# Patient Record
Sex: FEMALE | Race: WHITE | NOT HISPANIC OR LATINO | URBAN - METROPOLITAN AREA
[De-identification: names, ages, dates, MRNs, and addresses within clinical notes are randomized per-mention and may not be internally consistent; named-entity substitution may affect disease eponyms.]

---

## 2023-10-17 DIAGNOSIS — Z76.0 MEDICATION REFILL: ICD-10-CM

## 2023-10-17 NOTE — TELEPHONE ENCOUNTER
PT came in requesting refills on RX omeprazole 40 MG and amlodipine/benazepril caps 5/10 MG -[facility] to [facility].

## 2023-10-18 RX ORDER — AMLODIPINE AND BENAZEPRIL HYDROCHLORIDE 5; 10 MG/1; MG/1
1 CAPSULE ORAL EVERY 24 HOURS
Qty: 90 CAPSULE | Refills: 1 | Status: SHIPPED | OUTPATIENT
Start: 2023-10-18 | End: 2024-01-22 | Stop reason: SDUPTHER

## 2023-10-18 RX ORDER — OMEPRAZOLE 40 MG/1
40 CAPSULE, DELAYED RELEASE ORAL EVERY 24 HOURS
Qty: 90 CAPSULE | Refills: 1 | Status: SHIPPED | OUTPATIENT
Start: 2023-10-18 | End: 2024-01-22 | Stop reason: SDUPTHER

## 2023-10-18 RX ORDER — AMLODIPINE AND BENAZEPRIL HYDROCHLORIDE 5; 10 MG/1; MG/1
1 CAPSULE ORAL EVERY 24 HOURS
COMMUNITY
Start: 2021-12-06 | End: 2023-10-18 | Stop reason: SDUPTHER

## 2023-10-18 RX ORDER — OMEPRAZOLE 40 MG/1
40 CAPSULE, DELAYED RELEASE ORAL EVERY 24 HOURS
COMMUNITY
Start: 2021-11-03 | End: 2023-10-18 | Stop reason: SDUPTHER

## 2024-01-22 DIAGNOSIS — Z76.0 MEDICATION REFILL: ICD-10-CM

## 2024-01-22 RX ORDER — AMLODIPINE AND BENAZEPRIL HYDROCHLORIDE 5; 10 MG/1; MG/1
1 CAPSULE ORAL EVERY 24 HOURS
Qty: 90 CAPSULE | Refills: 1 | Status: SHIPPED | OUTPATIENT
Start: 2024-01-22 | End: 2024-01-30 | Stop reason: SDUPTHER

## 2024-01-22 RX ORDER — OMEPRAZOLE 40 MG/1
40 CAPSULE, DELAYED RELEASE ORAL EVERY 24 HOURS
Qty: 90 CAPSULE | Refills: 1 | Status: SHIPPED | OUTPATIENT
Start: 2024-01-22 | End: 2024-01-30 | Stop reason: SDUPTHER

## 2024-01-22 NOTE — TELEPHONE ENCOUNTER
PT requesting refills on RX omeprazole 40 MG and amlodipine. [facility]   good balance

## 2024-01-30 DIAGNOSIS — Z76.0 MEDICATION REFILL: ICD-10-CM

## 2024-01-30 RX ORDER — AMLODIPINE AND BENAZEPRIL HYDROCHLORIDE 5; 10 MG/1; MG/1
1 CAPSULE ORAL EVERY 24 HOURS
Qty: 90 CAPSULE | Refills: 1 | Status: SHIPPED | OUTPATIENT
Start: 2024-01-30 | End: 2024-01-31 | Stop reason: SDUPTHER

## 2024-01-30 RX ORDER — OMEPRAZOLE 40 MG/1
40 CAPSULE, DELAYED RELEASE ORAL EVERY 24 HOURS
Qty: 90 CAPSULE | Refills: 1 | Status: SHIPPED | OUTPATIENT
Start: 2024-01-30 | End: 2024-01-31 | Stop reason: SDUPTHER

## 2024-01-31 DIAGNOSIS — Z76.0 MEDICATION REFILL: ICD-10-CM

## 2024-01-31 RX ORDER — AMLODIPINE AND BENAZEPRIL HYDROCHLORIDE 5; 10 MG/1; MG/1
1 CAPSULE ORAL EVERY 24 HOURS
Qty: 90 CAPSULE | Refills: 1 | Status: SHIPPED | OUTPATIENT
Start: 2024-01-31 | End: 2024-04-10 | Stop reason: SDUPTHER

## 2024-01-31 RX ORDER — OMEPRAZOLE 40 MG/1
40 CAPSULE, DELAYED RELEASE ORAL EVERY 24 HOURS
Qty: 90 CAPSULE | Refills: 1 | Status: SHIPPED | OUTPATIENT
Start: 2024-01-31 | End: 2024-04-10 | Stop reason: SDUPTHER

## 2024-01-31 NOTE — TELEPHONE ENCOUNTER
PT requesting Refill on RX omeprizole and amlodipine.  RX sent to mail order pharmacy 1/30/2024-requesting supply be sent to [facility] in [place] until mail order pharmacy cane be delivered.

## 2024-04-10 DIAGNOSIS — Z76.0 MEDICATION REFILL: ICD-10-CM

## 2024-04-10 RX ORDER — AMLODIPINE AND BENAZEPRIL HYDROCHLORIDE 5; 10 MG/1; MG/1
1 CAPSULE ORAL EVERY 24 HOURS
Qty: 90 CAPSULE | Refills: 1 | Status: SHIPPED | OUTPATIENT
Start: 2024-04-10

## 2024-04-10 RX ORDER — OMEPRAZOLE 40 MG/1
40 CAPSULE, DELAYED RELEASE ORAL EVERY 24 HOURS
Qty: 90 CAPSULE | Refills: 1 | Status: SHIPPED | OUTPATIENT
Start: 2024-04-10

## 2024-04-10 NOTE — TELEPHONE ENCOUNTER
PT requesting refills on RX omeprazole, and amlodipine. Please send to [facility] mail order pharmacy

## 2024-07-18 DIAGNOSIS — Z76.0 MEDICATION REFILL: ICD-10-CM

## 2024-07-18 NOTE — TELEPHONE ENCOUNTER
Pt is requesting a refill on RX'S Omeprazole and Amlodipine, please send to [facility]

## 2024-07-25 DIAGNOSIS — Z76.0 MEDICATION REFILL: ICD-10-CM

## 2024-07-25 RX ORDER — AMLODIPINE AND BENAZEPRIL HYDROCHLORIDE 5; 10 MG/1; MG/1
1 CAPSULE ORAL EVERY 24 HOURS
Qty: 90 CAPSULE | Refills: 1 | Status: SHIPPED | OUTPATIENT
Start: 2024-07-25 | End: 2024-07-25 | Stop reason: SDUPTHER

## 2024-07-25 RX ORDER — AMLODIPINE AND BENAZEPRIL HYDROCHLORIDE 5; 10 MG/1; MG/1
1 CAPSULE ORAL EVERY 24 HOURS
Qty: 90 CAPSULE | Refills: 1 | Status: SHIPPED | OUTPATIENT
Start: 2024-07-25 | End: 2024-07-26 | Stop reason: SDUPTHER

## 2024-07-25 RX ORDER — OMEPRAZOLE 40 MG/1
40 CAPSULE, DELAYED RELEASE ORAL EVERY 24 HOURS
Qty: 90 CAPSULE | Refills: 1 | Status: SHIPPED | OUTPATIENT
Start: 2024-07-25 | End: 2024-07-26 | Stop reason: SDUPTHER

## 2024-07-25 RX ORDER — OMEPRAZOLE 40 MG/1
40 CAPSULE, DELAYED RELEASE ORAL EVERY 24 HOURS
Qty: 90 CAPSULE | Refills: 1 | Status: SHIPPED | OUTPATIENT
Start: 2024-07-25 | End: 2024-07-25 | Stop reason: SDUPTHER

## 2024-07-25 NOTE — TELEPHONE ENCOUNTER
Pt came in today requesting refills Omeprazole and Amlodipine, please send to [facility], pt has an appointment for 07/26/24

## 2024-07-25 NOTE — PROGRESS NOTES
This is a 89 year old female for FU visit and MED REVIEW and she broke her hearing aids and is going to see AUDIOLOGY for new hearing aids or repair of old ones.      ROS is NEG for HEADACHE, NAUSEA, VOMITING, DIARRHEA, CHEST PAIN, SOB, and BLEEDING and as further REVIEWED BELOW.    Subjective   Nirmala Patrick is a 89 y.o. female who presents for No chief complaint on file..    HPI:    Per nursing intake, pt here for No chief complaint on file.       Review of systems is essentially negative for all systems except for any identified issues in HPI above.    Objective     /76   Pulse 84   Temp 36.1 °C (96.9 °F)   Wt 71.7 kg (158 lb)   SpO2 98%   BMI 27.55 kg/m²      Physical Examination:       GENERAL           General Appearance: well-appearing, well-developed, well-hydrated, well-nourished, no acute distress.        HEENT           NECK supple, no masses or thyromegaly, no carotid bruit.        EYES           Extraocular Movements: normal, bilateral eyes ANNETTE, no conjunctival injection.        HEART           Rate and Rhythm regular rate and rhythm. Heart sounds: normal S1S2, no S3 or S4. Murmurs: none.        CHEST           Breath sounds: Clear to IPPA, RR<16 no use of accessory muscles.        ABDOMEN           General: Neg for LKKS or masses, no scleral icterus or jaundice.        MUSCULOSKELETAL           Joints Demonstration: Neg for erythema, swelling or joint deformities. gross abnormalities no gross abnormalities.        EXTREMITIES           Lower Extremities: Neg for cyanosis, clubbing or edema.       Assessment/Plan   Problem List Items Addressed This Visit    None  Visit Diagnoses       Encounter for medication review    -  Primary    Hypertension, unspecified type        Gastroesophageal reflux disease without esophagitis                FOLLOW UP:  PRN and as specified above         Lisa Li M.D.

## 2024-07-26 ENCOUNTER — OFFICE VISIT (OUTPATIENT)
Dept: PRIMARY CARE | Facility: CLINIC | Age: 89
End: 2024-07-26
Payer: MEDICARE

## 2024-07-26 VITALS
BODY MASS INDEX: 27.55 KG/M2 | WEIGHT: 158 LBS | DIASTOLIC BLOOD PRESSURE: 76 MMHG | TEMPERATURE: 96.9 F | SYSTOLIC BLOOD PRESSURE: 126 MMHG | OXYGEN SATURATION: 98 % | HEART RATE: 84 BPM

## 2024-07-26 DIAGNOSIS — Z79.899 ENCOUNTER FOR MEDICATION REVIEW: Primary | ICD-10-CM

## 2024-07-26 DIAGNOSIS — I10 HYPERTENSION, UNSPECIFIED TYPE: ICD-10-CM

## 2024-07-26 DIAGNOSIS — K21.9 GASTROESOPHAGEAL REFLUX DISEASE WITHOUT ESOPHAGITIS: ICD-10-CM

## 2024-07-26 DIAGNOSIS — Z76.0 MEDICATION REFILL: ICD-10-CM

## 2024-07-26 PROCEDURE — 3078F DIAST BP <80 MM HG: CPT | Performed by: FAMILY MEDICINE

## 2024-07-26 PROCEDURE — 1036F TOBACCO NON-USER: CPT | Performed by: FAMILY MEDICINE

## 2024-07-26 PROCEDURE — 99213 OFFICE O/P EST LOW 20 MIN: CPT | Performed by: FAMILY MEDICINE

## 2024-07-26 PROCEDURE — 1159F MED LIST DOCD IN RCRD: CPT | Performed by: FAMILY MEDICINE

## 2024-07-26 PROCEDURE — 3074F SYST BP LT 130 MM HG: CPT | Performed by: FAMILY MEDICINE

## 2024-07-26 RX ORDER — OMEPRAZOLE 40 MG/1
40 CAPSULE, DELAYED RELEASE ORAL EVERY 24 HOURS
Qty: 90 CAPSULE | Refills: 1 | Status: SHIPPED | OUTPATIENT
Start: 2024-07-26

## 2024-07-26 RX ORDER — AMLODIPINE AND BENAZEPRIL HYDROCHLORIDE 5; 10 MG/1; MG/1
1 CAPSULE ORAL EVERY 24 HOURS
Qty: 90 CAPSULE | Refills: 1 | Status: SHIPPED | OUTPATIENT
Start: 2024-07-26

## 2024-07-26 ASSESSMENT — PATIENT HEALTH QUESTIONNAIRE - PHQ9
SUM OF ALL RESPONSES TO PHQ9 QUESTIONS 1 AND 2: 0
1. LITTLE INTEREST OR PLEASURE IN DOING THINGS: NOT AT ALL
2. FEELING DOWN, DEPRESSED OR HOPELESS: NOT AT ALL

## 2024-08-14 NOTE — PROGRESS NOTES
Subjective   Nirmala Patrick is a 89 y.o. female who presents with her boyfriend Darron Phillip for TRANSFER OF PCP CARE FROM DR. SAHU FOR MEDICATION REVIEW.    HPI:      89 y.o. female who presents with her boyfriend Darron Phillip for TRANSFER OF PCP CARE FROM DR. SAHU FOR MEDICATION REVIEW.     EMR/EPIC records reviewed.    Last saw Dr. Sahu 7/26/24 for medication review.    PMHx:  HTN  GERD    Healthcare Providers:  Prior PCP: Dr. Sahu    Preventive Health Services:  -Last physical:/Medicare Wellness Visit: ?  -pap smears: no longer indicated due to advanced age  -last mammogram: ? ; no longer indicated due to advanced age  -last colonoscopy: ?; no longer indicated due to advanced age  -last STI screening: ?  -Hep C screening: ?  -DEXA: ?; NOW DUE    Immunizations:   -Childhood vaccines: completed per patient    -updated COVID spike vaccine: NOW DUE  -Prevnar 20:   NOW DUE    Immunization History   Administered Date(s) Administered    Flu vaccine, quadrivalent, high-dose, preservative free, age 65y+ (FLUZONE) 10/08/2021, 10/12/2022, 09/14/2023    Influenza, seasonal, injectable 11/04/2011    Pfizer Gray Cap SARS-CoV-2 02/22/2022, 07/21/2022    Pfizer Purple Cap SARS-CoV-2 12/31/2021    Pneumococcal conjugate vaccine, 13-valent (PREVNAR 13) 05/31/2018, 05/31/2018    Pneumococcal polysaccharide vaccine, 23-valent, age 2 years and older (PNEUMOVAX 23) 09/04/2019    Tdap vaccine, age 7 year and older (BOOSTRIX, ADACEL) 09/04/2019    Zoster, live 09/04/2013         Today Nirmala  reports:    - doing and feeling well.     -throughout visit today, patient repeatedly asked the same questions and would forget things immediately after they were discussed.     -Her boyfriend states that she frequently forgets things and he is concerned that she has dementia. She expressed interest in referral to neurology for evaluation.    -taking all medications as prescribed with no reported adverse medication side effects      Today she  has no other reported complaints, issues, or problems.  ROS is NEG for HEADACHE, NAUSEA, VOMITING, DIARRHEA, CHEST PAIN, SOB, and BLEEDING.  Review of systems (12) is  negative for all systems except for any identified issues in HPI above.    Objective     /76   Pulse 100   Temp 36.6 °C (97.8 °F)   Wt 70.3 kg (155 lb)   SpO2 100%   BMI 27.03 kg/m²      Physical Examination:       GENERAL           General Appearance: well-appearing, well-developed, well-hydrated, well-nourished, no acute distress. Would repeatedly ask questions multiple times  that had just been answered.        HEENT           NECK supple, no masses or thyromegaly, no carotid bruit.        EYES           Extraocular Movements: normal, bilateral eyes ANNETTE, no conjunctival injection.        HEART           Rate and Rhythm regular rate and rhythm. Heart sounds: normal S1S2, no S3 or S4. Murmurs: none.        CHEST           Breath sounds: Clear to IPPA, RR<16 no use of accessory muscles.        ABDOMEN           General: Neg for LKKS or masses, no scleral icterus or jaundice.        MUSCULOSKELETAL           Joints Demonstration: Neg for erythema, swelling or joint deformities. gross abnormalities no gross abnormalities.        EXTREMITIES           Lower Extremities: Neg for cyanosis, clubbing or edema.       SKIN: multiple atypical nevi on face, arms, and legs      Assessment/Plan   Problem List Items Addressed This Visit    None  Visit Diagnoses       Primary hypertension    -  Primary    Relevant Orders    Comprehensive metabolic panel    Urinalysis with Reflex Microscopic    Encounter for medication review        Gastroesophageal reflux disease without esophagitis        Lipid screening        Relevant Orders    Lipid panel    Vitamin D deficiency        Relevant Orders    Vitamin D 25-Hydroxy,Total (for eval of Vitamin D levels)    Encounter for hepatitis C screening test for low risk patient        Relevant Orders    Hepatitis C  antibody    Routine screening for STI (sexually transmitted infection)        Relevant Orders    HIV 1/2 Antigen/Antibody Screen with Reflex to Confirmation    Syphilis Screen with Reflex    Age related osteoporosis, unspecified pathological fracture presence        Relevant Orders    XR DEXA bone density    Immunization counseling        Encounter for routine laboratory testing        Relevant Orders    CBC and Auto Differential    Comprehensive metabolic panel    Urinalysis with Reflex Microscopic    Tsh With Reflex To Free T4 If Abnormal    Other fatigue        Relevant Orders    CBC and Auto Differential    Tsh With Reflex To Free T4 If Abnormal    Encounter for administration of vaccine        Relevant Orders    Pneumococcal conjugate vaccine, 20-valent (PREVNAR 20)          Encounter for routine labs:  -labs ordered (see A/P above)    HTN: well controlled  -CMP, UA ordered  -cont BP medications  -low salt diet, regular exercise, limit alcohol intake    Memory decline:  -referral to neurology  and neuropsychology ordered    Atypical Nevi:  -referral to dermatology ordered    GERD:  -cont PPI     Lipid Disorder screening  -lipid panel ordered    Vitamin D deficiency  -Vit D levels ordered     Hep C screening  -Hep C antibody ordered     STI Screening:  -HIV, syphilis ordered    Age related osteoporosis:  -DEXA ordered       Counseling:       Medication education:         Education:  The patient is counseled regarding potential side-effects of all new medications        Understanding:  Patient expressed understanding        Adherence:  No barriers to adherence identified        Immunizations Counseling  -Prevnar 20 now due==> RECEIVED TODAY   -recommend updated COVID spike vaccine, shingles, and RSV that can be obtained at local pharmacy     FOLLOW-UP: 4 weeks to discuss and review test results and for Medicare Wellness Visit      Discussed recommended plan of care with patient. Patient expressed understanding  and agreement with plan of care. All of patient's questions were answered at the time. Patient had no additional questions at the time.           Elizabet Sanchez MD, PhD

## 2024-08-14 NOTE — PATIENT INSTRUCTIONS
It was nice seeing you today.    Please go to [facility] lab or another [facility] lab facility to complete the lab testing that I ordered      Please call radiology to schedule  DEXA bone scan    Please call referral ine to schedule appointments with: 1) dermatology for skin check since you have some atypical moles/freckles; and 2) neurology for memory issues and 3) neuropsychology for memory issues       I recommend receiving the Prevnar 20 pneumonia vaccine: You received this vaccine today    I recommend the updated COVID vaccine, RSV, and shingles that can be obtained at your local pharmacy    Please schedule a follow up with me in 4  weeks for Medicare Wellness Visit (please use these exact words when scheduling)

## 2024-08-16 ENCOUNTER — OFFICE VISIT (OUTPATIENT)
Dept: PRIMARY CARE | Facility: CLINIC | Age: 89
End: 2024-08-16
Payer: MEDICARE

## 2024-08-16 VITALS
HEART RATE: 100 BPM | TEMPERATURE: 97.8 F | WEIGHT: 155 LBS | SYSTOLIC BLOOD PRESSURE: 122 MMHG | DIASTOLIC BLOOD PRESSURE: 76 MMHG | OXYGEN SATURATION: 100 % | BODY MASS INDEX: 27.03 KG/M2

## 2024-08-16 DIAGNOSIS — Z23 ENCOUNTER FOR ADMINISTRATION OF VACCINE: ICD-10-CM

## 2024-08-16 DIAGNOSIS — Z79.899 ENCOUNTER FOR MEDICATION REVIEW: ICD-10-CM

## 2024-08-16 DIAGNOSIS — R41.89 COGNITIVE CHANGE: ICD-10-CM

## 2024-08-16 DIAGNOSIS — Z11.59 ENCOUNTER FOR HEPATITIS C SCREENING TEST FOR LOW RISK PATIENT: ICD-10-CM

## 2024-08-16 DIAGNOSIS — I10 PRIMARY HYPERTENSION: Primary | ICD-10-CM

## 2024-08-16 DIAGNOSIS — R53.83 OTHER FATIGUE: ICD-10-CM

## 2024-08-16 DIAGNOSIS — E55.9 VITAMIN D DEFICIENCY: ICD-10-CM

## 2024-08-16 DIAGNOSIS — Z11.3 ROUTINE SCREENING FOR STI (SEXUALLY TRANSMITTED INFECTION): ICD-10-CM

## 2024-08-16 DIAGNOSIS — M81.0 AGE RELATED OSTEOPOROSIS, UNSPECIFIED PATHOLOGICAL FRACTURE PRESENCE: ICD-10-CM

## 2024-08-16 DIAGNOSIS — K21.9 GASTROESOPHAGEAL REFLUX DISEASE WITHOUT ESOPHAGITIS: ICD-10-CM

## 2024-08-16 DIAGNOSIS — Z01.89 ENCOUNTER FOR ROUTINE LABORATORY TESTING: ICD-10-CM

## 2024-08-16 DIAGNOSIS — D22.9 ATYPICAL NEVI: ICD-10-CM

## 2024-08-16 DIAGNOSIS — Z13.220 LIPID SCREENING: ICD-10-CM

## 2024-08-16 DIAGNOSIS — Z71.85 IMMUNIZATION COUNSELING: ICD-10-CM

## 2024-08-16 PROCEDURE — 3074F SYST BP LT 130 MM HG: CPT | Performed by: FAMILY MEDICINE

## 2024-08-16 PROCEDURE — 1126F AMNT PAIN NOTED NONE PRSNT: CPT | Performed by: FAMILY MEDICINE

## 2024-08-16 PROCEDURE — 1036F TOBACCO NON-USER: CPT | Performed by: FAMILY MEDICINE

## 2024-08-16 PROCEDURE — G2211 COMPLEX E/M VISIT ADD ON: HCPCS | Performed by: FAMILY MEDICINE

## 2024-08-16 PROCEDURE — 99214 OFFICE O/P EST MOD 30 MIN: CPT | Performed by: FAMILY MEDICINE

## 2024-08-16 PROCEDURE — 3078F DIAST BP <80 MM HG: CPT | Performed by: FAMILY MEDICINE

## 2024-08-16 PROCEDURE — 1159F MED LIST DOCD IN RCRD: CPT | Performed by: FAMILY MEDICINE

## 2024-08-16 PROCEDURE — 90677 PCV20 VACCINE IM: CPT | Performed by: FAMILY MEDICINE

## 2024-08-16 ASSESSMENT — PATIENT HEALTH QUESTIONNAIRE - PHQ9
2. FEELING DOWN, DEPRESSED OR HOPELESS: NOT AT ALL
SUM OF ALL RESPONSES TO PHQ9 QUESTIONS 1 AND 2: 0
1. LITTLE INTEREST OR PLEASURE IN DOING THINGS: NOT AT ALL

## 2024-08-16 ASSESSMENT — COLUMBIA-SUICIDE SEVERITY RATING SCALE - C-SSRS
2. HAVE YOU ACTUALLY HAD ANY THOUGHTS OF KILLING YOURSELF?: NO
6. HAVE YOU EVER DONE ANYTHING, STARTED TO DO ANYTHING, OR PREPARED TO DO ANYTHING TO END YOUR LIFE?: NO
1. IN THE PAST MONTH, HAVE YOU WISHED YOU WERE DEAD OR WISHED YOU COULD GO TO SLEEP AND NOT WAKE UP?: NO

## 2024-08-16 ASSESSMENT — ENCOUNTER SYMPTOMS
DEPRESSION: 0
OCCASIONAL FEELINGS OF UNSTEADINESS: 0
LOSS OF SENSATION IN FEET: 0

## 2024-08-16 ASSESSMENT — PAIN SCALES - GENERAL: PAINLEVEL: 0-NO PAIN

## 2024-10-25 DIAGNOSIS — I15.9 SECONDARY HYPERTENSION: ICD-10-CM

## 2024-10-25 DIAGNOSIS — Z76.0 MEDICATION REFILL: ICD-10-CM

## 2024-10-25 PROBLEM — I10 HYPERTENSION: Status: ACTIVE | Noted: 2024-10-25

## 2024-10-25 RX ORDER — OMEPRAZOLE 40 MG/1
40 CAPSULE, DELAYED RELEASE ORAL EVERY 24 HOURS
Qty: 90 CAPSULE | Refills: 0 | Status: SHIPPED | OUTPATIENT
Start: 2024-10-25

## 2024-10-25 RX ORDER — AMLODIPINE AND BENAZEPRIL HYDROCHLORIDE 5; 10 MG/1; MG/1
1 CAPSULE ORAL EVERY 24 HOURS
Qty: 90 CAPSULE | Refills: 0 | Status: SHIPPED | OUTPATIENT
Start: 2024-10-25

## 2024-10-25 NOTE — TELEPHONE ENCOUNTER
Pt is requesting refills on RX'S Amlodipine and Omeprazole, pt would like to change the pharmacy and would like these to be sent to [facility] [place], please advise, pt states she only has 2 pills left

## 2024-12-23 PROBLEM — K21.9 GASTROESOPHAGEAL REFLUX DISEASE WITHOUT ESOPHAGITIS: Status: ACTIVE | Noted: 2024-12-23

## 2024-12-23 NOTE — PROGRESS NOTES
Subjective   Nirmala Patrick is a 89 y.o. female who presents  with her boyfriend Darron Fisher  for FOLLOW UP VISIT FOR HTN and GERD.    HPI:      89 y.o. female who presents  with her boyfriend Darron Fisher  for FOLLOW UP VISIT FOR HTN and GERD.     EMR/EPIC records reviewed.    Last seen by me on 8/16/24 with her boyfriend Darron Fisher for TRANSFER OF PCP CARE FROM DR. SAHU FOR MEDICATION REVIEW. At visit:    Encounter for routine labs:  -labs ordered (see A/P above)     HTN: well controlled  -CMP, UA ordered  -cont BP medications  -low salt diet, regular exercise, limit alcohol intake     Memory decline:  -referral to neurology  and neuropsychology ordered     Atypical Nevi:  -referral to dermatology ordered     GERD:  -cont PPI               Lipid Disorder screening  -lipid panel ordered     Vitamin D deficiency  -Vit D levels ordered     Hep C screening  -Hep C antibody ordered     STI Screening:  -HIV, syphilis ordered     Age related osteoporosis:  -DEXA ordered            Immunizations Counseling  -Prevnar 20 now due==> RECEIVED TODAY   -recommend updated COVID spike vaccine, shingles, and RSV that can be obtained at local pharmacy     FOLLOW-UP: 4 weeks to discuss and review test results and for Medicare Wellness Visit           Last saw Dr. Sahu 7/26/24 for medication review.     PMHx:  HTN  GERD  Atypical Nevi     Healthcare Providers:  Prior PCP: Dr. Sahu     Preventive Health Services:  -Last physical:/Medicare Wellness Visit: ?  -pap smears: no longer indicated due to advanced age  -last mammogram: ? ; no longer indicated due to advanced age  -last colonoscopy: ?; no longer indicated due to advanced age  -last STI screening: ?  -Hep C screening: ?  -DEXA: ?; NOW DUE; ordered 8/16/24 NOT YET COMPLETED     Immunizations:   -Childhood vaccines: completed per patient    -updated COVID spike vaccine: NOW DUE  -Flu: NOW DUE   Immunization History   Administered Date(s) Administered    COVID-19, mRNA, LNP-S,  PF, 30 mcg/0.3 mL dose 12/31/2021    Flu vaccine, quadrivalent, high-dose, preservative free, age 65y+ (FLUZONE) 10/08/2021, 10/12/2022, 09/14/2023    Influenza, seasonal, injectable 11/04/2011    Pfizer Gray Cap SARS-CoV-2 02/22/2022, 07/21/2022    Pneumococcal conjugate vaccine, 13-valent (PREVNAR 13) 05/31/2018, 05/31/2018    Pneumococcal conjugate vaccine, 20-valent (PREVNAR 20) 08/16/2024    Pneumococcal polysaccharide vaccine, 23-valent, age 2 years and older (PNEUMOVAX 23) 09/04/2019    Tdap vaccine, age 7 year and older (BOOSTRIX, ADACEL) 09/04/2019    Zoster, live 09/04/2013        INTERVAL HISTORY:    -labs and DEXA ordered 8/16/24 NOT YET COMPLETED      Today Nirmala reports:     - doing and feeling well.      -throughout visit today, patient repeatedly asked the same questions and would forget things immediately after they were discussed.      -Her boyfriend states that she frequently forgets things and he is concerned that she has dementia. At last visit on 8/16/24 she expressed interest in referral to neurology for evaluation and waas previously referred to neuropsychology and neurology on 8/16/24.     -taking all medications as prescribed with no reported adverse medication side effects    -has not yet scheduled appts with neurology, neuropsychology, and dermatology; she states that she will call and schedule appts        Today she has no other reported complaints, issues, or problems.  ROS is NEG for HEADACHE, NAUSEA, VOMITING, DIARRHEA, CHEST PAIN, SOB, and BLEEDING.  Review of systems (12) is  negative for all systems except for any identified issues in HPI above.      Objective     /70   Pulse 85   Temp 36.1 °C (97 °F)   SpO2 99%      Physical Examination:       GENERAL           General Appearance: well-appearing, well-developed, well-hydrated, well-nourished, no acute distress.        HEENT           NECK supple, no masses or thyromegaly, no carotid bruit.        EYES            Extraocular Movements: normal, bilateral eyes ANNETTE, no conjunctival injection.        HEART           Rate and Rhythm regular rate and rhythm. Heart sounds: normal S1S2, no S3 or S4. Murmurs: none.        CHEST           Breath sounds: Clear to IPPA, RR<16 no use of accessory muscles.        ABDOMEN           General: Neg for LKKS or masses, no scleral icterus or jaundice.        MUSCULOSKELETAL           Joints Demonstration: Neg for erythema, swelling or joint deformities. gross abnormalities no gross abnormalities.        EXTREMITIES           Lower Extremities: Neg for cyanosis, clubbing or edema.       Assessment/Plan   Problem List Items Addressed This Visit       Hypertension - Primary    Gastroesophageal reflux disease without esophagitis     Other Visit Diagnoses       Atypical nevi        Cognitive change        Age related osteoporosis, unspecified pathological fracture presence        Immunization counseling              HTN: well controlled  -CMP, UA ordered 8/16/24  -cont BP medications  -low salt diet, regular exercise, limit alcohol intake     Memory decline: no red flag signs/sxs.  -referral to neurology and neuropsychology ordered 8/16/24  -emergency Dept precautions discussed and reviewed with patient and boyfriend     Atypical Nevi:  -referral to dermatology ordered 8/16/24     GERD:  -cont PPI               Lipid Disorder screening  -lipid panel ordered 8/16/24     Vitamin D deficiency  -Vit D levels ordered 8/16/24     Hep C screening  -Hep C antibody ordered 8/16/24     STI Screening:  -HIV, syphilis ordered 8/16/24     Age related osteoporosis:  -DEXA ordered 8/16/24    Other: patient advised to complete previously ordered labs        Counseling:       Medication education:         Education:  The patient is counseled regarding potential side-effects of all new medications        Understanding:  Patient expressed understanding        Adherence:  No barriers to adherence identified         Immunizations Counseling  -Flu now due==>  RECEIVED TODAY  -recommend updated COVID spike vaccine, shingles, and RSV that can be obtained at local pharmacy     FOLLOW-UP: 4 weeks to discuss and review test results and for Medicare Wellness Visit      Discussed recommended plan of care with patient. Patient expressed understanding and agreement with plan of care. All of patient's questions were answered at the time. Patient had no additional questions at the time.            Elizabet Sanchez MD, PhD

## 2024-12-24 ENCOUNTER — OFFICE VISIT (OUTPATIENT)
Dept: PRIMARY CARE | Facility: CLINIC | Age: 89
End: 2024-12-24
Payer: MEDICARE

## 2024-12-24 VITALS
OXYGEN SATURATION: 99 % | TEMPERATURE: 97 F | BODY MASS INDEX: 27.55 KG/M2 | SYSTOLIC BLOOD PRESSURE: 142 MMHG | DIASTOLIC BLOOD PRESSURE: 70 MMHG | HEART RATE: 85 BPM | WEIGHT: 158 LBS

## 2024-12-24 DIAGNOSIS — K21.9 GASTROESOPHAGEAL REFLUX DISEASE WITHOUT ESOPHAGITIS: ICD-10-CM

## 2024-12-24 DIAGNOSIS — R41.89 COGNITIVE CHANGE: ICD-10-CM

## 2024-12-24 DIAGNOSIS — I10 PRIMARY HYPERTENSION: Primary | ICD-10-CM

## 2024-12-24 DIAGNOSIS — Z23 ENCOUNTER FOR ADMINISTRATION OF VACCINE: ICD-10-CM

## 2024-12-24 DIAGNOSIS — M81.0 AGE RELATED OSTEOPOROSIS, UNSPECIFIED PATHOLOGICAL FRACTURE PRESENCE: ICD-10-CM

## 2024-12-24 DIAGNOSIS — Z71.85 IMMUNIZATION COUNSELING: ICD-10-CM

## 2024-12-24 DIAGNOSIS — D22.9 ATYPICAL NEVI: ICD-10-CM

## 2024-12-24 PROCEDURE — 1160F RVW MEDS BY RX/DR IN RCRD: CPT | Performed by: FAMILY MEDICINE

## 2024-12-24 PROCEDURE — 99214 OFFICE O/P EST MOD 30 MIN: CPT | Mod: 25 | Performed by: FAMILY MEDICINE

## 2024-12-24 PROCEDURE — 1126F AMNT PAIN NOTED NONE PRSNT: CPT | Performed by: FAMILY MEDICINE

## 2024-12-24 PROCEDURE — 3078F DIAST BP <80 MM HG: CPT | Performed by: FAMILY MEDICINE

## 2024-12-24 PROCEDURE — 1036F TOBACCO NON-USER: CPT | Performed by: FAMILY MEDICINE

## 2024-12-24 PROCEDURE — 3077F SYST BP >= 140 MM HG: CPT | Performed by: FAMILY MEDICINE

## 2024-12-24 PROCEDURE — 90662 IIV NO PRSV INCREASED AG IM: CPT | Performed by: FAMILY MEDICINE

## 2024-12-24 PROCEDURE — 99214 OFFICE O/P EST MOD 30 MIN: CPT | Performed by: FAMILY MEDICINE

## 2024-12-24 PROCEDURE — G2211 COMPLEX E/M VISIT ADD ON: HCPCS | Performed by: FAMILY MEDICINE

## 2024-12-24 PROCEDURE — 1159F MED LIST DOCD IN RCRD: CPT | Performed by: FAMILY MEDICINE

## 2024-12-24 ASSESSMENT — COLUMBIA-SUICIDE SEVERITY RATING SCALE - C-SSRS
2. HAVE YOU ACTUALLY HAD ANY THOUGHTS OF KILLING YOURSELF?: NO
1. IN THE PAST MONTH, HAVE YOU WISHED YOU WERE DEAD OR WISHED YOU COULD GO TO SLEEP AND NOT WAKE UP?: NO
6. HAVE YOU EVER DONE ANYTHING, STARTED TO DO ANYTHING, OR PREPARED TO DO ANYTHING TO END YOUR LIFE?: NO

## 2024-12-24 ASSESSMENT — PATIENT HEALTH QUESTIONNAIRE - PHQ9
1. LITTLE INTEREST OR PLEASURE IN DOING THINGS: NOT AT ALL
SUM OF ALL RESPONSES TO PHQ9 QUESTIONS 1 AND 2: 0
2. FEELING DOWN, DEPRESSED OR HOPELESS: NOT AT ALL

## 2024-12-24 ASSESSMENT — PAIN SCALES - GENERAL: PAINLEVEL_OUTOF10: 0-NO PAIN

## 2025-01-24 DIAGNOSIS — I15.9 SECONDARY HYPERTENSION: ICD-10-CM

## 2025-01-24 DIAGNOSIS — K21.9 GASTROESOPHAGEAL REFLUX DISEASE WITHOUT ESOPHAGITIS: ICD-10-CM

## 2025-01-24 RX ORDER — AMLODIPINE AND BENAZEPRIL HYDROCHLORIDE 5; 10 MG/1; MG/1
1 CAPSULE ORAL EVERY 24 HOURS
Qty: 90 CAPSULE | Refills: 0 | Status: SHIPPED | OUTPATIENT
Start: 2025-01-24

## 2025-01-24 RX ORDER — OMEPRAZOLE 40 MG/1
40 CAPSULE, DELAYED RELEASE ORAL EVERY 24 HOURS
Qty: 90 CAPSULE | Refills: 0 | Status: SHIPPED | OUTPATIENT
Start: 2025-01-24

## 2025-01-24 NOTE — TELEPHONE ENCOUNTER
Requesting refill on RX Amlodipine and omeprazole-[facility] in [place]    90 day supply requested with additional refills (son states he has COPD and going out in this weather is too hard for him to come into the office to request additional refills)

## 2025-03-28 ENCOUNTER — APPOINTMENT (OUTPATIENT)
Dept: PRIMARY CARE | Facility: CLINIC | Age: OVER 89
End: 2025-03-28
Payer: MEDICARE

## 2025-04-14 DIAGNOSIS — I15.9 SECONDARY HYPERTENSION: ICD-10-CM

## 2025-04-14 DIAGNOSIS — K21.9 GASTROESOPHAGEAL REFLUX DISEASE WITHOUT ESOPHAGITIS: ICD-10-CM

## 2025-04-14 RX ORDER — AMLODIPINE AND BENAZEPRIL HYDROCHLORIDE 5; 10 MG/1; MG/1
1 CAPSULE ORAL EVERY 24 HOURS
Qty: 90 CAPSULE | Refills: 0 | Status: SHIPPED | OUTPATIENT
Start: 2025-04-14

## 2025-04-14 RX ORDER — OMEPRAZOLE 40 MG/1
40 CAPSULE, DELAYED RELEASE ORAL EVERY 24 HOURS
Qty: 90 CAPSULE | Refills: 0 | Status: SHIPPED | OUTPATIENT
Start: 2025-04-14

## 2025-05-05 ENCOUNTER — APPOINTMENT (OUTPATIENT)
Dept: RADIOLOGY | Facility: HOSPITAL | Age: 89
DRG: 057 | End: 2025-05-05
Payer: MEDICARE

## 2025-05-05 ENCOUNTER — HOSPITAL ENCOUNTER (INPATIENT)
Facility: HOSPITAL | Age: 89
LOS: 5 days | Discharge: NURSING, REHAB OR LONG-TERM CARE | DRG: 057 | End: 2025-05-10
Attending: INTERNAL MEDICINE | Admitting: INTERNAL MEDICINE
Payer: MEDICARE

## 2025-05-05 DIAGNOSIS — F02.80 DEMENTIA DUE TO ALZHEIMER'S DISEASE (MULTI): ICD-10-CM

## 2025-05-05 DIAGNOSIS — K59.00 CONSTIPATION, UNSPECIFIED CONSTIPATION TYPE: ICD-10-CM

## 2025-05-05 DIAGNOSIS — G30.9 DEMENTIA DUE TO ALZHEIMER'S DISEASE (MULTI): ICD-10-CM

## 2025-05-05 DIAGNOSIS — R10.84 GENERALIZED ABDOMINAL PAIN: ICD-10-CM

## 2025-05-05 DIAGNOSIS — I48.20 CHRONIC A-FIB (MULTI): ICD-10-CM

## 2025-05-05 DIAGNOSIS — I48.92 ATRIAL FIB/FLUTTER, TRANSIENT (MULTI): Primary | ICD-10-CM

## 2025-05-05 DIAGNOSIS — I48.91 ATRIAL FIB/FLUTTER, TRANSIENT (MULTI): Primary | ICD-10-CM

## 2025-05-05 LAB
ALBUMIN SERPL BCP-MCNC: 4 G/DL (ref 3.4–5)
ALP SERPL-CCNC: 63 U/L (ref 33–136)
ALT SERPL W P-5'-P-CCNC: 13 U/L (ref 7–45)
ANION GAP SERPL CALCULATED.3IONS-SCNC: 11 MMOL/L (ref 10–20)
APPEARANCE UR: CLEAR
AST SERPL W P-5'-P-CCNC: 17 U/L (ref 9–39)
BASOPHILS # BLD AUTO: 0.03 X10*3/UL (ref 0–0.1)
BASOPHILS NFR BLD AUTO: 0.3 %
BILIRUB SERPL-MCNC: 1 MG/DL (ref 0–1.2)
BILIRUB UR STRIP.AUTO-MCNC: NEGATIVE MG/DL
BUN SERPL-MCNC: 12 MG/DL (ref 6–23)
CALCIUM SERPL-MCNC: 9.6 MG/DL (ref 8.6–10.3)
CHLORIDE SERPL-SCNC: 102 MMOL/L (ref 98–107)
CO2 SERPL-SCNC: 29 MMOL/L (ref 21–32)
COLOR UR: COLORLESS
CREAT SERPL-MCNC: 0.62 MG/DL (ref 0.5–1.05)
EGFRCR SERPLBLD CKD-EPI 2021: 84 ML/MIN/1.73M*2
EOSINOPHIL # BLD AUTO: 0.18 X10*3/UL (ref 0–0.4)
EOSINOPHIL NFR BLD AUTO: 1.6 %
ERYTHROCYTE [DISTWIDTH] IN BLOOD BY AUTOMATED COUNT: 13.4 % (ref 11.5–14.5)
GLUCOSE SERPL-MCNC: 105 MG/DL (ref 74–99)
GLUCOSE UR STRIP.AUTO-MCNC: NORMAL MG/DL
HCT VFR BLD AUTO: 42.4 % (ref 36–46)
HGB BLD-MCNC: 14.6 G/DL (ref 12–16)
IMM GRANULOCYTES # BLD AUTO: 0.02 X10*3/UL (ref 0–0.5)
IMM GRANULOCYTES NFR BLD AUTO: 0.2 % (ref 0–0.9)
KETONES UR STRIP.AUTO-MCNC: NEGATIVE MG/DL
LACTATE SERPL-SCNC: 1.1 MMOL/L (ref 0.4–2)
LEUKOCYTE ESTERASE UR QL STRIP.AUTO: NEGATIVE
LIPASE SERPL-CCNC: 23 U/L (ref 9–82)
LYMPHOCYTES # BLD AUTO: 2.38 X10*3/UL (ref 0.8–3)
LYMPHOCYTES NFR BLD AUTO: 20.7 %
MAGNESIUM SERPL-MCNC: 1.47 MG/DL (ref 1.6–2.4)
MCH RBC QN AUTO: 30.7 PG (ref 26–34)
MCHC RBC AUTO-ENTMCNC: 34.4 G/DL (ref 32–36)
MCV RBC AUTO: 89 FL (ref 80–100)
MONOCYTES # BLD AUTO: 0.7 X10*3/UL (ref 0.05–0.8)
MONOCYTES NFR BLD AUTO: 6.1 %
NEUTROPHILS # BLD AUTO: 8.16 X10*3/UL (ref 1.6–5.5)
NEUTROPHILS NFR BLD AUTO: 71.1 %
NITRITE UR QL STRIP.AUTO: NEGATIVE
NRBC BLD-RTO: 0 /100 WBCS (ref 0–0)
PH UR STRIP.AUTO: 7.5 [PH]
PLATELET # BLD AUTO: 232 X10*3/UL (ref 150–450)
POTASSIUM SERPL-SCNC: 3.8 MMOL/L (ref 3.5–5.3)
PROT SERPL-MCNC: 6.8 G/DL (ref 6.4–8.2)
PROT UR STRIP.AUTO-MCNC: NEGATIVE MG/DL
RBC # BLD AUTO: 4.76 X10*6/UL (ref 4–5.2)
RBC # UR STRIP.AUTO: NEGATIVE MG/DL
SODIUM SERPL-SCNC: 138 MMOL/L (ref 136–145)
SP GR UR STRIP.AUTO: 1.01
TSH SERPL-ACNC: 2.52 MIU/L (ref 0.44–3.98)
UROBILINOGEN UR STRIP.AUTO-MCNC: NORMAL MG/DL
WBC # BLD AUTO: 11.5 X10*3/UL (ref 4.4–11.3)

## 2025-05-05 PROCEDURE — 85025 COMPLETE CBC W/AUTO DIFF WBC: CPT | Performed by: PHYSICIAN ASSISTANT

## 2025-05-05 PROCEDURE — 81003 URINALYSIS AUTO W/O SCOPE: CPT | Performed by: PHYSICIAN ASSISTANT

## 2025-05-05 PROCEDURE — 99223 1ST HOSP IP/OBS HIGH 75: CPT | Performed by: INTERNAL MEDICINE

## 2025-05-05 PROCEDURE — 96365 THER/PROPH/DIAG IV INF INIT: CPT

## 2025-05-05 PROCEDURE — 73521 X-RAY EXAM HIPS BI 2 VIEWS: CPT

## 2025-05-05 PROCEDURE — 36415 COLL VENOUS BLD VENIPUNCTURE: CPT | Performed by: PHYSICIAN ASSISTANT

## 2025-05-05 PROCEDURE — 70450 CT HEAD/BRAIN W/O DYE: CPT

## 2025-05-05 PROCEDURE — 2500000004 HC RX 250 GENERAL PHARMACY W/ HCPCS (ALT 636 FOR OP/ED): Mod: JZ | Performed by: PHYSICIAN ASSISTANT

## 2025-05-05 PROCEDURE — 83605 ASSAY OF LACTIC ACID: CPT | Performed by: PHYSICIAN ASSISTANT

## 2025-05-05 PROCEDURE — 96366 THER/PROPH/DIAG IV INF ADDON: CPT

## 2025-05-05 PROCEDURE — 2500000004 HC RX 250 GENERAL PHARMACY W/ HCPCS (ALT 636 FOR OP/ED): Performed by: INTERNAL MEDICINE

## 2025-05-05 PROCEDURE — 2500000002 HC RX 250 W HCPCS SELF ADMINISTERED DRUGS (ALT 637 FOR MEDICARE OP, ALT 636 FOR OP/ED): Performed by: INTERNAL MEDICINE

## 2025-05-05 PROCEDURE — 1200000002 HC GENERAL ROOM WITH TELEMETRY DAILY

## 2025-05-05 PROCEDURE — 84443 ASSAY THYROID STIM HORMONE: CPT | Performed by: INTERNAL MEDICINE

## 2025-05-05 PROCEDURE — 2550000001 HC RX 255 CONTRASTS: Mod: JZ | Performed by: PHYSICIAN ASSISTANT

## 2025-05-05 PROCEDURE — 2500000001 HC RX 250 WO HCPCS SELF ADMINISTERED DRUGS (ALT 637 FOR MEDICARE OP): Performed by: INTERNAL MEDICINE

## 2025-05-05 PROCEDURE — 80053 COMPREHEN METABOLIC PANEL: CPT | Performed by: PHYSICIAN ASSISTANT

## 2025-05-05 PROCEDURE — 96361 HYDRATE IV INFUSION ADD-ON: CPT

## 2025-05-05 PROCEDURE — 74177 CT ABD & PELVIS W/CONTRAST: CPT | Performed by: STUDENT IN AN ORGANIZED HEALTH CARE EDUCATION/TRAINING PROGRAM

## 2025-05-05 PROCEDURE — 99285 EMERGENCY DEPT VISIT HI MDM: CPT | Mod: 25

## 2025-05-05 PROCEDURE — 74177 CT ABD & PELVIS W/CONTRAST: CPT

## 2025-05-05 PROCEDURE — 83690 ASSAY OF LIPASE: CPT | Performed by: PHYSICIAN ASSISTANT

## 2025-05-05 PROCEDURE — 83735 ASSAY OF MAGNESIUM: CPT | Performed by: PHYSICIAN ASSISTANT

## 2025-05-05 PROCEDURE — 70450 CT HEAD/BRAIN W/O DYE: CPT | Performed by: RADIOLOGY

## 2025-05-05 RX ORDER — AMLODIPINE AND BENAZEPRIL HYDROCHLORIDE 5; 10 MG/1; MG/1
1 CAPSULE ORAL DAILY
COMMUNITY
End: 2025-05-10 | Stop reason: HOSPADM

## 2025-05-05 RX ORDER — POTASSIUM CHLORIDE 20 MEQ/1
20 TABLET, EXTENDED RELEASE ORAL ONCE
Status: COMPLETED | OUTPATIENT
Start: 2025-05-05 | End: 2025-05-05

## 2025-05-05 RX ORDER — HEPARIN SODIUM 5000 [USP'U]/ML
5000 INJECTION, SOLUTION INTRAVENOUS; SUBCUTANEOUS EVERY 12 HOURS
Status: DISCONTINUED | OUTPATIENT
Start: 2025-05-05 | End: 2025-05-10 | Stop reason: HOSPADM

## 2025-05-05 RX ORDER — OMEPRAZOLE 40 MG/1
40 CAPSULE, DELAYED RELEASE ORAL
COMMUNITY
End: 2025-05-10 | Stop reason: HOSPADM

## 2025-05-05 RX ORDER — ACETAMINOPHEN 500 MG
500 TABLET ORAL NIGHTLY
COMMUNITY
End: 2025-05-10 | Stop reason: HOSPADM

## 2025-05-05 RX ORDER — FUROSEMIDE 40 MG/1
40 TABLET ORAL DAILY
Status: DISCONTINUED | OUTPATIENT
Start: 2025-05-05 | End: 2025-05-08

## 2025-05-05 RX ORDER — TRAMADOL HYDROCHLORIDE 50 MG/1
50 TABLET ORAL EVERY 6 HOURS PRN
Status: DISCONTINUED | OUTPATIENT
Start: 2025-05-05 | End: 2025-05-10 | Stop reason: HOSPADM

## 2025-05-05 RX ORDER — ACETAMINOPHEN 325 MG/1
650 TABLET ORAL 3 TIMES DAILY
Status: DISCONTINUED | OUTPATIENT
Start: 2025-05-05 | End: 2025-05-10 | Stop reason: HOSPADM

## 2025-05-05 RX ORDER — MAGNESIUM SULFATE HEPTAHYDRATE 40 MG/ML
2 INJECTION, SOLUTION INTRAVENOUS ONCE
Status: COMPLETED | OUTPATIENT
Start: 2025-05-05 | End: 2025-05-05

## 2025-05-05 RX ORDER — AMLODIPINE BESYLATE 5 MG/1
5 TABLET ORAL DAILY
Status: DISCONTINUED | OUTPATIENT
Start: 2025-05-06 | End: 2025-05-10 | Stop reason: HOSPADM

## 2025-05-05 RX ORDER — PANTOPRAZOLE SODIUM 40 MG/1
40 TABLET, DELAYED RELEASE ORAL
Status: DISCONTINUED | OUTPATIENT
Start: 2025-05-06 | End: 2025-05-10 | Stop reason: HOSPADM

## 2025-05-05 RX ADMIN — POTASSIUM CHLORIDE 20 MEQ: 1500 TABLET, EXTENDED RELEASE ORAL at 17:49

## 2025-05-05 RX ADMIN — IOHEXOL 75 ML: 350 INJECTION, SOLUTION INTRAVENOUS at 13:43

## 2025-05-05 RX ADMIN — TRAMADOL HYDROCHLORIDE 50 MG: 50 TABLET, COATED ORAL at 22:11

## 2025-05-05 RX ADMIN — ACETAMINOPHEN 650 MG: 325 TABLET ORAL at 17:49

## 2025-05-05 RX ADMIN — ACETAMINOPHEN 650 MG: 325 TABLET ORAL at 22:10

## 2025-05-05 RX ADMIN — SODIUM CHLORIDE 1000 ML: 9 INJECTION, SOLUTION INTRAVENOUS at 12:23

## 2025-05-05 RX ADMIN — HEPARIN SODIUM 5000 UNITS: 5000 INJECTION INTRAVENOUS; SUBCUTANEOUS at 22:10

## 2025-05-05 RX ADMIN — FUROSEMIDE 40 MG: 40 TABLET ORAL at 17:49

## 2025-05-05 RX ADMIN — MAGNESIUM SULFATE IN WATER 2 G: 40 INJECTION, SOLUTION INTRAVENOUS at 12:57

## 2025-05-05 ASSESSMENT — PAIN - FUNCTIONAL ASSESSMENT
PAIN_FUNCTIONAL_ASSESSMENT: 0-10

## 2025-05-05 ASSESSMENT — PAIN SCALES - GENERAL
PAINLEVEL_OUTOF10: 8
PAINLEVEL_OUTOF10: 8
PAINLEVEL_OUTOF10: 0 - NO PAIN
PAINLEVEL_OUTOF10: 0 - NO PAIN

## 2025-05-05 ASSESSMENT — COLUMBIA-SUICIDE SEVERITY RATING SCALE - C-SSRS
1. IN THE PAST MONTH, HAVE YOU WISHED YOU WERE DEAD OR WISHED YOU COULD GO TO SLEEP AND NOT WAKE UP?: NO
6. HAVE YOU EVER DONE ANYTHING, STARTED TO DO ANYTHING, OR PREPARED TO DO ANYTHING TO END YOUR LIFE?: NO
2. HAVE YOU ACTUALLY HAD ANY THOUGHTS OF KILLING YOURSELF?: NO

## 2025-05-05 ASSESSMENT — PAIN DESCRIPTION - ORIENTATION: ORIENTATION: MID

## 2025-05-05 ASSESSMENT — PAIN DESCRIPTION - LOCATION: LOCATION: BACK

## 2025-05-05 NOTE — ED PROVIDER NOTES
HPI   Chief Complaint   Patient presents with    Abdominal Pain     Pt states she has abd pain that went away when she had a bowel movement. She has no complaints and no pain at this time       HPI  Patient is a 89-year-old female here for evaluation of abdominal pain, states that she thought it was the result of eating bad food last night.  No diarrhea.  No specific vomiting.  Says that just some abdominal cramping.  She states that she did feel improved after a bowel movement.  But currently states that the pain is improving.  However she seemed a little bit confused when telling her story and when asked about abdominal surgeries she proceeded to state that there was only about the pain in her belly.  She seemed to be somewhat confused to some of the medical questioning.  Unsure if this is baseline.  Apparently a well-known friend is in route.      Patient History   Medical History[1]  Surgical History[2]  Family History[3]  Social History[4]    Physical Exam   ED Triage Vitals [05/05/25 1154]   Temperature Heart Rate Respirations BP   37 °C (98.6 °F) 80 18 (!) 186/96      Pulse Ox Temp Source Heart Rate Source Patient Position   100 % Oral Monitor Lying      BP Location FiO2 (%)     Right arm --       Physical Exam  PHYSICAL EXAMINATION    GENERAL APPEARANCE: Awake and alert.     VITAL SIGNS: As per the nurses' triage record.     HEENT: Normocephalic, atraumatic. Extraocular muscles are intact. Pupils equal round and reactive to light. Conjunctiva are pink. Negative scleral icterus. Mucous membranes are moist. Tongue in the midline. Pharynx was without erythema or exudates, uvula midline    NECK: Soft Nontender and supple, full gross ROM, no meningeal signs.    CHEST: Nontender to palpation. Clear to auscultation bilaterally. No rales, rhonchi, or wheezing.     HEART: S1, S2. Regular rate and rhythm. No murmurs, gallops or rubs.  Strong and equal pulses in the extremities.     ABDOMEN: Soft, nontender, no pain  guarding or rebound.  Nondistended, positive bowel sounds, no palpable masses.    MUSCULOSKELETAL: The calves are nontender to palpation. Full gross active range of motion. Ambulating on own with no acute difficulties     NEUROLOGICAL: Awake, alert and oriented x 3. Power intact in the upper and lower extremities. Sensation is intact to light touch in the upper and lower extremities.     IMMUNOLOGICAL: No lymphatic streaking noted     DERM: No petechiae, rashes, or ecchymoses.    ED Course & MDM   ED Course as of 05/05/25 1510   Mon May 05, 2025   1255 MAGNESIUM(!): 1.47  Supplementation ordered [AP]   1507 Spoke to Dr. Almaraz.  He has agreed to hospitalize the patient under his service. 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 Please excuse any errors of transcription.  My thought process and reason for plan has been formulated from the time that I saw the patient until the time of disposition and is not specific to one specific moment during their visit and furthermore my MDM encompasses this entire chart and not only this text box.      HPI: Detailed above.    Exam: A medically appropriate exam performed, outlined above, given the known history and presentation.    History Limited by: Nothing    History obtained from: The patient    External/internal records reviewed: No external record reviewed    Social Determinants of Health considered during this visit: Lives at home    Chronic conditions impacting care: Denies    Medications given during visit:  Medications   sodium chloride 0.9 % bolus 1,000 mL (0 mL  intravenous Stopped 5/5/25 1356)   magnesium sulfate 2 g in sterile water for injection 50 mL (2 g intravenous New Bag 5/5/25 1257)   iohexol (OMNIPaque) 350 mg iodine/mL solution 75 mL (75 mL intravenous Given 5/5/25 1343)        Diagnostic/tests  Labs Reviewed   CBC WITH AUTO DIFFERENTIAL - Abnormal       Result Value    WBC 11.5 (*)     nRBC 0.0      RBC 4.76      Hemoglobin 14.6      Hematocrit 42.4      MCV 89      MCH 30.7      MCHC 34.4      RDW 13.4      Platelets 232      Neutrophils % 71.1      Immature Granulocytes %, Automated 0.2      Lymphocytes % 20.7      Monocytes % 6.1      Eosinophils % 1.6      Basophils % 0.3      Neutrophils Absolute 8.16 (*)     Immature Granulocytes Absolute, Automated 0.02      Lymphocytes Absolute 2.38      Monocytes Absolute 0.70      Eosinophils Absolute 0.18      Basophils Absolute 0.03     COMPREHENSIVE METABOLIC PANEL - Abnormal    Glucose 105 (*)     Sodium 138      Potassium 3.8      Chloride 102      Bicarbonate 29      Anion Gap 11      Urea Nitrogen 12      Creatinine 0.62      eGFR 84      Calcium 9.6      Albumin 4.0      Alkaline Phosphatase 63      Total Protein 6.8      AST 17      Bilirubin, Total 1.0      ALT 13     MAGNESIUM - Abnormal    Magnesium 1.47 (*)    URINALYSIS WITH REFLEX CULTURE AND MICROSCOPIC - Abnormal    Color, Urine Colorless (*)     Appearance, Urine Clear      Specific Gravity, Urine 1.009      pH, Urine 7.5      Protein, Urine NEGATIVE      Glucose, Urine Normal      Blood, Urine NEGATIVE      Ketones, Urine NEGATIVE      Bilirubin, Urine NEGATIVE      Urobilinogen, Urine Normal      Nitrite, Urine NEGATIVE      Leukocyte Esterase, Urine NEGATIVE     LIPASE - Normal    Lipase 23      Narrative:     Venipuncture immediately after or during the administration of Metamizole may lead to falsely low results. Testing should be performed immediately prior to Metamizole dosing.   LACTATE - Normal    Lactate 1.1      Narrative:     Venipuncture  immediately after or during the administration of Metamizole may lead to falsely low results. Testing should be performed immediately prior to Metamizole dosing.   URINALYSIS WITH REFLEX CULTURE AND MICROSCOPIC    Narrative:     The following orders were created for panel order Urinalysis with Reflex Culture and Microscopic.  Procedure                               Abnormality         Status                     ---------                               -----------         ------                     Urinalysis with Reflex C...[[id number]]  Abnormal            Final result               Extra Urine Gray Tube[[id number]]                            In process                   Please view results for these tests on the individual orders.   EXTRA URINE GRAY TUBE      CT abdomen pelvis w IV contrast   Final Result   1.  No acute findings in the abdomen and pelvis.   2. Fluid-filled moderately distended urinary bladder.   3. Large fat and bowel containing ventral hernia.   4. Colonic diverticulosis without diverticulitis.             Signed by: Adithya Manrique 5/5/2025 2:00 PM   Dictation workstation:   [id number]      CT head wo IV contrast   Final Result   No acute intracranial pathology.             Signed by: Seth Flores 5/5/2025 1:41 PM   Dictation workstation:   [id number]               Considerations/further MDM:  I spoke with Dr. Almaraz . We thoroughly discussed the history, physical exam, laboratory and imaging studies, as well as, emergency department course. Based upon that discussion, we've decided to admit for further observation and evaluation of their symptoms.  As I have deemed necessary from their history, physical and studies, I have considered and evaluated for the following diagnoses: Cavernous thrombosis, diabetes and complications related to, intracranial hemorrhage , meningitis, sepsis, intracranial hemorrhage,  intracranial hematoma, hemorrhagic or thrombolic stroke, anemia, dehydration, electrolyte  disturbances    Follow recommendations of the hospitalist, has indicated agreement and hospitalization for consideration of placement.    Procedure  Procedures       [1] No past medical history on file.  [2] No past surgical history on file.  [3] No family history on file.  [4]   Social History  Tobacco Use    Smoking status: Not on file    Smokeless tobacco: Not on file   Substance Use Topics    Alcohol use: Not on file    Drug use: Not on file        Russell Cross PA-C  05/05/25 2406

## 2025-05-05 NOTE — ASSESSMENT & PLAN NOTE
She clearly has dementia and it seems most consistent with Alzheimer's type.  She is already had her brain CAT scan.  Will check thyroid and B12 levels.  Will monitor for behavioral disturbances.    Atrial fibrillation-will get an echocardiogram and keep on telemetry.  Consult cardiology.    Hypertension-she takes amlodipine.  She already got her dose today.  Her blood pressure is quite high.  She is also grossly fluid overloaded.  I am starting her on furosemide 40 mg a day first dose now.  Continue amlodipine.    Gait issues-addition to dementia she also has osteoarthritis.  She has had bilateral knee replacements.  She has pain in both of her hips.  Will get x-rays of her hips.  Put her on round-the-clock Tylenol as needed tramadol and order PT OT eval's..    Discussed with the patient's daughter the fact that we will sort through some of these medical issues but there is limits to what we can do for dementia and declining gait in a 89-year-old.  After we get a physical therapy eval we will start talking about discharge options including home care skilled rehab or long-term care

## 2025-05-05 NOTE — ED NOTES
Pt to ED for lower abdominal pain initially that she believed was from eating bad food last night. Pt denied vomiting, diarrhea. She is confused, but can stated her name, birthday and understands she is in the hospital. She is not understanding the situation and repeats herself. She is very hard of hearing. Friend is at bedside who lives with her and cares for her. Daughter was present but left. Pt states she cannot walk without a walker. She said she was getting up more at night to urinate, made it to the bathroom and then would pee all over the floor. Pt denies any chest pain, SOB. Pt with pain in the bilateral lower legs from previous surgeries/injuries. She is admitted for dementia, a-fib/flutter, generalized abdominal pain and constipation. Pt has been hypertensive since arrival, placed on purewick with success. She will want a wheelchair to the restroom if she needs to have BM.      Marisol Baez RN  05/05/25 1913       Marisol Baez RN  05/05/25 1915

## 2025-05-05 NOTE — H&P
History Of Present Illness  Nirmala Siddiqi is a 89 y.o. female presenting with variety of issues..  She is 89 years old.  She lives in her home home with the assistance of her boyfriend..  Patient is an exceedingly poor historian.  She is clearly demented.  Her daughter and her boyfriend provide much of the history.  The patient's been a state of decline over the last year in terms of her ambulatory function and her mental status.  She has a known abdominal wall hernia.  A couple days ago she had an abdominal pain that actually went away earlier today when she moved her bowels.  Right now she feels fine.  She is being treated for hypertension.  Here in the ER today she seems to be in A-fib that is rate controlled.  She is also a bit hypertensive..     Past Medical History  Hypertension    Surgical History  She has no past surgical history on file.     Social History  She has no history on file for tobacco use, alcohol use, and drug use.    Family History  Family History[1]-Negative for heart disease     Allergies  Patient has no known allergies.    Review of Systems patient is too demented to run through a laundry list of symptoms.     Physical Exam  She is alert cooperative speaking clearly very hard of hearing and an exceedingly poor historian.  Seems to have very little insight into recent events or her medical issues.  Head atraumatic normocephalic  Pupils equal round reactive to light extraocular motion intact  Mouth normal-appearing tongue and oropharynx  Neck supple without thyromegaly  Lymph nodes no cervical or axillary lymphadenopathy  Heart regular irregular but normal rate no murmur  Lungs clear to auscultation normal to percussion  Abdomen soft nontender nondistended she does have a palpable and reducible periumbilical hernia  Extremities tight 3+ pitting bilateral leg edema with no ulcerations or cellulitis.  No vesicles no weeping  Neuro cranial nerves grossly intact good tone strength in arms good  finger-to-nose.  Having difficulty lifting both of her legs up off the bed.  She can do it but she cannot lift them very far.  Limited range of motion of hips and knees due to pain.  Skin no rashes or ulcerations  Last Recorded Vitals  BP (!) 182/79   Pulse 89   Temp 37 °C (98.6 °F) (Oral)   Resp 14   Wt 83.9 kg (185 lb)   SpO2 97%     Relevant Results  Head CT shows small vessel ischemic changes.  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  She is already had her brain CAT scan.  Will check thyroid and B12 levels.  Will monitor for behavioral disturbances.    Atrial fibrillation-will get an echocardiogram and keep on telemetry.  Consult cardiology.    Hypertension-she takes amlodipine.  She already got her dose today.  Her blood pressure is quite high.  She is also grossly fluid overloaded.  I am starting her on furosemide 40 mg a day first dose now.  Continue amlodipine.    Gait issues-addition to dementia she also has osteoarthritis.  She has had bilateral knee replacements.  She has pain in both of her hips.  Will get x-rays of her hips.  Put her on round-the-clock Tylenol as needed tramadol and order PT OT eval's..    Discussed with the patient's daughter the fact that we will sort through some of these medical issues but there is limits to what we can do for dementia and declining gait in a 89-year-old.  After we get a physical therapy eval we will start talking about discharge options including home care skilled rehab or long-term care           Marquez Almaraz MD         [1] No family history on file.

## 2025-05-05 NOTE — PROGRESS NOTES
Pharmacy Medication History Review    Nirmala Siddiqi is a 89 y.o. 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 Pharmacy reviewed the patient's [id number] medications and allergies for accuracy.    Medications ADDED:  Acetaminophen 500 mg at bedtime  Amlodipine-Benazepril 5 mg-10 mg once daily  Omeprazole 40 mg once daily  Medications CHANGED:  none  Medications REMOVED:   none     The list below reflects the updated PTA list.   Prior to Admission Medications   Prescriptions Last Dose Informant   acetaminophen (Tylenol) 500 mg tablet 5/4/2025 Bedtime Spouse/Significant Other   Sig: Take 1 tablet (500 mg) by mouth once daily at bedtime.   amLODIPine-benazepriL (Lotrel) 5-10 mg capsule 5/5/2025 Morning Other   Sig: Take 1 capsule by mouth once daily.   omeprazole (PriLOSEC) 40 mg DR capsule 5/5/2025 Morning Other   Sig: Take 1 capsule (40 mg) by mouth once daily in the morning. Take before meals. Do not crush or chew.      Facility-Administered Medications: None        The list below reflects the updated allergy list. Please review each documented allergy for additional clarification and justification.  Allergies  Reviewed by Tracy Velarde RN on 5/5/2025   No Known Allergies         Patient accepts M2B at discharge.     Sources:   Pharmacy dispense history  Spouse   Moderate historian, Child daughter Good historian  Call to retail pharmacy     Additional Comments:  none    Flores Quintanilla, PharmD  ED Pharmacist  05/05/25

## 2025-05-06 ENCOUNTER — APPOINTMENT (OUTPATIENT)
Dept: RADIOLOGY | Facility: HOSPITAL | Age: 89
DRG: 057 | End: 2025-05-06
Payer: MEDICARE

## 2025-05-06 ENCOUNTER — APPOINTMENT (OUTPATIENT)
Dept: CARDIOLOGY | Facility: HOSPITAL | Age: 89
DRG: 057 | End: 2025-05-06
Payer: MEDICARE

## 2025-05-06 LAB
ANION GAP SERPL CALCULATED.3IONS-SCNC: 10 MMOL/L (ref 10–20)
AORTIC VALVE PEAK VELOCITY: 1.22 M/S
AV PEAK GRADIENT: 6 MMHG
AVA (PEAK VEL): 2.64 CM2
BUN SERPL-MCNC: 15 MG/DL (ref 6–23)
CALCIUM SERPL-MCNC: 9.1 MG/DL (ref 8.6–10.3)
CHLORIDE SERPL-SCNC: 103 MMOL/L (ref 98–107)
CO2 SERPL-SCNC: 29 MMOL/L (ref 21–32)
CREAT SERPL-MCNC: 0.81 MG/DL (ref 0.5–1.05)
EGFRCR SERPLBLD CKD-EPI 2021: 68 ML/MIN/1.73M*2
EJECTION FRACTION APICAL 4 CHAMBER: 59.3
EJECTION FRACTION: 63 %
GLUCOSE SERPL-MCNC: 99 MG/DL (ref 74–99)
HOLD SPECIMEN: NORMAL
LEFT ATRIUM VOLUME AREA LENGTH INDEX BSA: 48.5 ML/M2
LEFT VENTRICLE INTERNAL DIMENSION DIASTOLE: 4.04 CM (ref 3.5–6)
LEFT VENTRICULAR OUTFLOW TRACT DIAMETER: 1.93 CM
LV EJECTION FRACTION BIPLANE: 57 %
MITRAL VALVE E/A RATIO: 4.59
MITRAL VALVE E/E' RATIO: 10.07
POTASSIUM SERPL-SCNC: 3.8 MMOL/L (ref 3.5–5.3)
Q ONSET: 228 MS
QRS COUNT: 12 BEATS
QRS DURATION: 74 MS
QT INTERVAL: 386 MS
QTC CALCULATION(BAZETT): 428 MS
QTC FREDERICIA: 414 MS
R AXIS: 94 DEGREES
RIGHT VENTRICLE FREE WALL PEAK S': 10 CM/S
RIGHT VENTRICLE PEAK SYSTOLIC PRESSURE: 22.9 MMHG
SODIUM SERPL-SCNC: 138 MMOL/L (ref 136–145)
T AXIS: 85 DEGREES
T OFFSET: 421 MS
TRICUSPID ANNULAR PLANE SYSTOLIC EXCURSION: 1.3 CM
VENTRICULAR RATE: 74 BPM
VIT B12 SERPL-MCNC: 370 PG/ML (ref 211–911)

## 2025-05-06 PROCEDURE — 2500000001 HC RX 250 WO HCPCS SELF ADMINISTERED DRUGS (ALT 637 FOR MEDICARE OP): Performed by: INTERNAL MEDICINE

## 2025-05-06 PROCEDURE — 72072 X-RAY EXAM THORAC SPINE 3VWS: CPT

## 2025-05-06 PROCEDURE — 36415 COLL VENOUS BLD VENIPUNCTURE: CPT | Performed by: INTERNAL MEDICINE

## 2025-05-06 PROCEDURE — 2500000004 HC RX 250 GENERAL PHARMACY W/ HCPCS (ALT 636 FOR OP/ED): Mod: JZ | Performed by: INTERNAL MEDICINE

## 2025-05-06 PROCEDURE — 97166 OT EVAL MOD COMPLEX 45 MIN: CPT | Mod: GO

## 2025-05-06 PROCEDURE — 82607 VITAMIN B-12: CPT | Mod: WESLAB | Performed by: INTERNAL MEDICINE

## 2025-05-06 PROCEDURE — 72100 X-RAY EXAM L-S SPINE 2/3 VWS: CPT | Performed by: RADIOLOGY

## 2025-05-06 PROCEDURE — 93005 ELECTROCARDIOGRAM TRACING: CPT

## 2025-05-06 PROCEDURE — 97162 PT EVAL MOD COMPLEX 30 MIN: CPT | Mod: GP

## 2025-05-06 PROCEDURE — 99232 SBSQ HOSP IP/OBS MODERATE 35: CPT | Performed by: INTERNAL MEDICINE

## 2025-05-06 PROCEDURE — 93306 TTE W/DOPPLER COMPLETE: CPT | Performed by: INTERNAL MEDICINE

## 2025-05-06 PROCEDURE — 80048 BASIC METABOLIC PNL TOTAL CA: CPT | Performed by: INTERNAL MEDICINE

## 2025-05-06 PROCEDURE — 93306 TTE W/DOPPLER COMPLETE: CPT

## 2025-05-06 PROCEDURE — 97530 THERAPEUTIC ACTIVITIES: CPT | Mod: GP

## 2025-05-06 PROCEDURE — 2500000002 HC RX 250 W HCPCS SELF ADMINISTERED DRUGS (ALT 637 FOR MEDICARE OP, ALT 636 FOR OP/ED): Performed by: INTERNAL MEDICINE

## 2025-05-06 PROCEDURE — 1200000002 HC GENERAL ROOM WITH TELEMETRY DAILY

## 2025-05-06 PROCEDURE — 72072 X-RAY EXAM THORAC SPINE 3VWS: CPT | Performed by: RADIOLOGY

## 2025-05-06 PROCEDURE — 72100 X-RAY EXAM L-S SPINE 2/3 VWS: CPT

## 2025-05-06 RX ORDER — POTASSIUM CHLORIDE 20 MEQ/1
20 TABLET, EXTENDED RELEASE ORAL DAILY
Status: DISCONTINUED | OUTPATIENT
Start: 2025-05-06 | End: 2025-05-08

## 2025-05-06 RX ORDER — CELECOXIB 200 MG/1
200 CAPSULE ORAL 2 TIMES DAILY
Status: DISCONTINUED | OUTPATIENT
Start: 2025-05-06 | End: 2025-05-08

## 2025-05-06 RX ADMIN — ACETAMINOPHEN 650 MG: 325 TABLET ORAL at 10:12

## 2025-05-06 RX ADMIN — HEPARIN SODIUM 5000 UNITS: 5000 INJECTION INTRAVENOUS; SUBCUTANEOUS at 10:13

## 2025-05-06 RX ADMIN — ACETAMINOPHEN 650 MG: 325 TABLET ORAL at 20:04

## 2025-05-06 RX ADMIN — ACETAMINOPHEN 650 MG: 325 TABLET ORAL at 15:35

## 2025-05-06 RX ADMIN — AMLODIPINE BESYLATE 5 MG: 5 TABLET ORAL at 10:12

## 2025-05-06 RX ADMIN — FUROSEMIDE 40 MG: 40 TABLET ORAL at 10:12

## 2025-05-06 RX ADMIN — POTASSIUM CHLORIDE 20 MEQ: 1500 TABLET, EXTENDED RELEASE ORAL at 17:24

## 2025-05-06 RX ADMIN — CELECOXIB 200 MG: 200 CAPSULE ORAL at 17:24

## 2025-05-06 RX ADMIN — HEPARIN SODIUM 5000 UNITS: 5000 INJECTION INTRAVENOUS; SUBCUTANEOUS at 20:04

## 2025-05-06 SDOH — SOCIAL STABILITY: SOCIAL INSECURITY: ABUSE: ADULT

## 2025-05-06 SDOH — SOCIAL STABILITY: SOCIAL INSECURITY: WERE YOU ABLE TO COMPLETE ALL THE BEHAVIORAL HEALTH SCREENINGS?: YES

## 2025-05-06 ASSESSMENT — COGNITIVE AND FUNCTIONAL STATUS - GENERAL
MOVING TO AND FROM BED TO CHAIR: A LOT
DAILY ACTIVITIY SCORE: 20
EATING MEALS: A LITTLE
CLIMB 3 TO 5 STEPS WITH RAILING: A LOT
TOILETING: A LOT
DRESSING REGULAR UPPER BODY CLOTHING: A LITTLE
TURNING FROM BACK TO SIDE WHILE IN FLAT BAD: A LITTLE
STANDING UP FROM CHAIR USING ARMS: A LOT
PERSONAL GROOMING: A LITTLE
PATIENT BASELINE BEDBOUND: NO
HELP NEEDED FOR BATHING: A LITTLE
MOBILITY SCORE: 16
MOVING FROM LYING ON BACK TO SITTING ON SIDE OF FLAT BED WITH BEDRAILS: A LITTLE
TOILETING: A LITTLE
WALKING IN HOSPITAL ROOM: A LOT
MOVING FROM LYING ON BACK TO SITTING ON SIDE OF FLAT BED WITH BEDRAILS: A LITTLE
STANDING UP FROM CHAIR USING ARMS: A LITTLE
MOVING TO AND FROM BED TO CHAIR: A LITTLE
CLIMB 3 TO 5 STEPS WITH RAILING: A LOT
DRESSING REGULAR UPPER BODY CLOTHING: A LITTLE
MOVING TO AND FROM BED TO CHAIR: A LITTLE
MOBILITY SCORE: 16
DRESSING REGULAR UPPER BODY CLOTHING: A LITTLE
MOVING FROM LYING ON BACK TO SITTING ON SIDE OF FLAT BED WITH BEDRAILS: A LOT
PATIENT BASELINE BEDBOUND: NO
HELP NEEDED FOR BATHING: A LOT
TOILETING: A LITTLE
PERSONAL GROOMING: A LITTLE
DAILY ACTIVITIY SCORE: 15
STANDING UP FROM CHAIR USING ARMS: A LITTLE
PERSONAL GROOMING: A LITTLE
MOBILITY SCORE: 11
TURNING FROM BACK TO SIDE WHILE IN FLAT BAD: A LITTLE
DAILY ACTIVITIY SCORE: 20
WALKING IN HOSPITAL ROOM: A LOT
TURNING FROM BACK TO SIDE WHILE IN FLAT BAD: A LOT
WALKING IN HOSPITAL ROOM: A LOT
HELP NEEDED FOR BATHING: A LITTLE
DRESSING REGULAR LOWER BODY CLOTHING: A LOT
CLIMB 3 TO 5 STEPS WITH RAILING: TOTAL

## 2025-05-06 ASSESSMENT — ACTIVITIES OF DAILY LIVING (ADL)
BATHING_ASSISTANCE: MODERATE
HEARING - LEFT EAR: HEARING AID
ASSISTIVE_DEVICE: WALKER
FEEDING YOURSELF: INDEPENDENT
ADL_ASSISTANCE: INDEPENDENT
JUDGMENT_ADEQUATE_SAFELY_COMPLETE_DAILY_ACTIVITIES: NO
DRESSING YOURSELF: NEEDS ASSISTANCE
PATIENT'S MEMORY ADEQUATE TO SAFELY COMPLETE DAILY ACTIVITIES?: NO
LACK_OF_TRANSPORTATION: NO
BATHING: INDEPENDENT
ADL_ASSISTANCE: INDEPENDENT
HEARING - RIGHT EAR: HEARING AID
TOILETING: INDEPENDENT
GROOMING: NEEDS ASSISTANCE
ADEQUATE_TO_COMPLETE_ADL: YES
WALKS IN HOME: NEEDS ASSISTANCE

## 2025-05-06 ASSESSMENT — PAIN SCALES - GENERAL
PAINLEVEL_OUTOF10: 0 - NO PAIN

## 2025-05-06 ASSESSMENT — PAIN - FUNCTIONAL ASSESSMENT
PAIN_FUNCTIONAL_ASSESSMENT: 0-10

## 2025-05-06 ASSESSMENT — LIFESTYLE VARIABLES
HOW OFTEN DO YOU HAVE A DRINK CONTAINING ALCOHOL: 4 OR MORE TIMES A WEEK
HOW MANY STANDARD DRINKS CONTAINING ALCOHOL DO YOU HAVE ON A TYPICAL DAY: 1 OR 2

## 2025-05-06 NOTE — PROGRESS NOTES
Patient is day 1 admission for dementia.      Spoke with patients significant other Darron, information provided for assessment.       Patient lives at home with Darron, there are two steps to enter the home. Patient uses a walker.  Darron states that patients daughter Ifrah is her POA and lives out of state.  Her daughter Tanja lives in [place].      PT OT evals are pending at this time    Awaiting PT evals to determine discharge planning needs.      05/06/25 1007   Discharge Planning   Living Arrangements Spouse/significant other   Support Systems Spouse/significant other;Children   Assistance Needed Spouse assists when needed, uses walker   Type of Residence Private residence   Number of Stairs to Enter Residence 2   Do you have animals or pets at home? No   Who is requesting discharge planning? Provider   Home or Post Acute Services In home services   Expected Discharge Disposition Othe   Does the patient need discharge transport arranged? Yes   RoundTrip coordination needed? Yes   Financial Resource Strain   How hard is it for you to pay for the very basics like food, housing, medical care, and heating? Not hard   Housing Stability   In the last 12 months, was there a time when you were not able to pay the mortgage or rent on time? N   In the past 12 months, how many times have you moved where you were living? 0   At any time in the past 12 months, were you homeless or living in a shelter (including now)? N   Transportation Needs   In the past 12 months, has lack of transportation kept you from medical appointments or from getting medications? no   In the past 12 months, has lack of transportation kept you from meetings, work, or from getting things needed for daily living? No   Patient Choice   Provider Choice list and CMS website (https://medicare.gov/care-compare#search) for post-acute Quality and Resource Measure Data were provided and reviewed with: Family   Patient / Family choosing to utilize agency /  facility established prior to hospitalization No   Stroke Family Assessment   Stroke Family Assessment Needed No

## 2025-05-06 NOTE — PROGRESS NOTES
Waldo Dickerson is a 89 y.o. female on day 1 of admission presenting with Dementia due to Alzheimer's disease (Multi).      Subjective   Admitted yesterday with episode of abdominal pain which quickly resolved.  Further discussion revealed a general decline in mobility and mental status over the last year.  X-rays of the hips and pelvis reveal sacral ileitis.  No evidence of ankylosing spondylitis on spine films. patient is clearly demented and lacks insight into her situation.  She says she feels great and wants to go home.       Objective     Alert pleasant but completely lacking all insight into her current medical situation  Heart regular rate and rhythm  Lungs clear to auscultation  Abdomen soft nontender distended  Extremities 2+ pitting bilateral leg edema  Last Recorded Vitals  /71 (BP Location: Left arm, Patient Position: Lying)   Pulse 70   Temp 37 °C (98.6 °F) (Oral)   Resp 18   Wt 83.9 kg (185 lb)   SpO2 96%   Intake/Output last 3 Shifts:    Intake/Output Summary (Last 24 hours) at 5/6/2025 1700  Last data filed at 5/6/2025 0600  Gross per 24 hour   Intake 50 ml   Output 3000 ml   Net -2950 ml       Admission Weight  Weight: 83.9 kg (185 lb) (05/05/25 1154)    Daily Weight  05/05/25 : 83.9 kg (185 lb)    Image Results  Transthoracic [place] [address]             Phone [phone]    TRANSTHORACIC ECHOCARDIOGRAM REPORT    Patient Name:       WALDO DICKERSON        Reading Physician:    [id number] [facility]                                                                   Study Date:         5/6/2025             Ordering Provider:    [id number] DARLENE CARRENO  MRN/PID:            [id number]             Fellow:  Accession#:         [id number]         Nurse:  Date of Birth/Age:  3/30/1932 / 89 years Sonographer:          Shruthi Plaza                                                                  [facility]  Gender Assigned at  F                    Additional Staff:  Birth:  Height:             162.60 cm            Admit Date:           5/5/2025  Weight:             83.91 kg             Admission Status:     Inpatient -                                                                 Routine  BSA / BMI:          1.89 m2 / 31.74      Department Location:                      kg/m2  Blood Pressure: 84 /35 mmHg    Study Type:    TRANSTHORACIC ECHO (TTE) COMPLETE  Diagnosis/ICD: Unspecified atrial fibrillation-I48.91  Indication:    Atrial Fibrillation  CPT Codes:     Echo Complete w Full Doppler-[id number]    Patient History:  Pertinent History: Atrial Fibrillation Dementia.    Study Detail: The following Echo studies were performed: 2D, M-Mode, Doppler and                color flow. Technically challenging study due to body habitus,                patient lying in supine position, prominent lung artifact,                Confused and the patient's lack of cooperation. A bubble study was                not performed.       PHYSICIAN INTERPRETATION:  Left Ventricle: The left ventricular systolic function is normal, with a visually estimated ejection fraction of 60-65%. There are no regional wall motion abnormalities. The left ventricular cavity size is normal. There is mild increased septal and mildly increased posterior left ventricular wall thickness. There is left ventricular concentric remodeling. Left ventricular diastolic filling was not assessed.  Left Atrium: The left atrial size is mildly dilated. A bubble study using agitated saline was not performed.  Right Ventricle: The right ventricle is normal in size. There is normal right ventricular global systolic function.  Right Atrium: The right atrial size is mildly dilated.  Aortic Valve: The aortic valve is trileaflet. There is mild aortic valve regurgitation. The peak instantaneous gradient of the aortic valve is 6 mmHg.  Mitral Valve: The  mitral valve is normal in structure. The peak instantaneous gradient of the mitral valve is 4 mmHg. There is moderate mitral valve regurgitation.  Tricuspid Valve: The tricuspid valve is structurally normal. There is mild tricuspid regurgitation.  Pulmonic Valve: The pulmonic valve is not well visualized. The pulmonic valve regurgitation was not well visualized.  Pericardium: No pericardial effusion noted.  Aorta: The aortic root is normal.  Systemic Veins: The inferior vena cava appears normal in size, with IVC inspiratory collapse greater than 50%.       CONCLUSIONS:   1. The left ventricular systolic function is normal, with a visually estimated ejection fraction of 60-65%.   2. There is normal right ventricular global systolic function.   3. Moderate mitral valve regurgitation.   4. Mild aortic valve regurgitation.    QUANTITATIVE DATA SUMMARY:     2D MEASUREMENTS:             Normal Ranges:  LAs:             4.32 cm     (2.7-4.0cm)  IVSd:            1.04 cm     (0.6-1.1cm)  LVPWd:           0.97 cm     (0.6-1.1cm)  LVIDd:           4.04 cm     (3.9-5.9cm)  LVIDs:           2.50 cm  LV Mass Index:   68.6 g/m2  LVEDV Index:     39.44 ml/m2  LV % FS          38.0 %       LEFT ATRIUM:                  Normal Ranges:  LA Vol A4C:        105.6 ml   (22+/-6mL/m2)  LA Vol A2C:        73.6 ml  LA Vol BP:         91.8 ml  LA Vol Index A4C:  55.8 ml/m2  LA Vol Index A2C:  38.9 ml/m2  LA Vol Index BP:   48.5 ml/m2  LA Area A4C:       28.2 cm2  LA Area A2C:       22.6 cm2  LA Major Axis A4C: 6.4 cm  LA Major Axis A2C: 5.9 cm  LA Volume Index:   48.7 ml/m2  LA Vol A4C:        97.7 ml  LA Vol A2C:        72.2 ml  LA Vol Index BSA:  44.9 ml/m2       RIGHT ATRIUM:                 Normal Ranges:  RA Vol A4C:        72.4 ml    (8.3-19.5ml)  RA Vol Index A4C:  38.2 ml/m2  RA Area A4C:       22.6 cm2  RA Major Axis A4C: 6.0 cm       M-MODE MEASUREMENTS:         Normal Ranges:  LAs:                 4.22 cm (2.7-4.0cm)       AORTA  MEASUREMENTS:         Normal Ranges:  Ao Sinus, d:        3.30 cm (2.1-3.5cm)  Ao STJ, d:          3.30 cm (1.7-3.4cm)  Asc Ao, d:          3.20 cm (2.1-3.4cm)       LV SYSTOLIC FUNCTION:                       Normal Ranges:  EF-A4C View:    59 % (>=55%)  EF-A2C View:    53 %  EF-Biplane:     57 %  EF-Visual:      63 %  LV EF Reported: 63 %       LV DIASTOLIC FUNCTION:             Normal Ranges:  MV Peak E:             0.90 m/s    (0.7-1.2 m/s)  MV Peak A:             0.20 m/s    (0.42-0.7 m/s)  E/A Ratio:             4.59        (1.0-2.2)  MV e'                  0.089 m/s   (>8.0)  MV lateral e'          0.10 m/s  MV medial e'           0.08 m/s  MV A Dur:              296.86 msec  E/e' Ratio:            10.07       (<8.0)  PulmV Sys Lauri:         40.74 cm/s  PulmV Tucker Lauri:        26.93 cm/s  PulmV S/D Lauri:         1.51  PulmV A Revs Lauri:      34.71 cm/s       MITRAL VALVE:          Normal Ranges:  MV Vmax:      1.06 m/s (<=1.3m/s)  MV peak P.5 mmHg (<5mmHg)  MV mean P.4 mmHg (<48mmHg)  MV VTI:       20.28 cm (10-13cm)  MV DT:        173 msec (150-240msec)       MITRAL INSUFFICIENCY:             Normal Ranges:  MR VTI:               171.96 cm  MR Vmax:              502.39 cm/s       AORTIC VALVE:           Normal Ranges:  AoV Vmax:      1.22 m/s (<=1.7m/s)  AoV Peak P.9 mmHg (<20mmHg)  LVOT Max Lauri:  1.10 m/s (<=1.1m/s)  LVOT VTI:      17.91 cm  LVOT Diameter: 1.93 cm  (1.8-2.4cm)  AoV Area,Vmax: 2.64 cm2 (2.5-4.5cm2)       RIGHT VENTRICLE:  RV Basal 3.21 cm  RV Mid   2.00 cm  RV Major 5.4 cm  TAPSE:   13.5 mm  RV s'    0.10 m/s       TRICUSPID VALVE/RVSP:          Normal Ranges:  Peak TR Velocity:     2.23 m/s  RV Syst Pressure:     23 mmHg  (< 30mmHg)  IVC Diam:             1.21 cm       PULMONIC VALVE:          Normal Ranges:  PV Accel Time:  89 msec  (>120ms)  PV Max Lauri:     0.8 m/s  (0.6-0.9m/s)  PV Max P.7 mmHg       PULMONARY VEINS:  PulmV A Revs Lauri: 34.71 cm/s  PulmV Tucker Lauri:    26.93 cm/s  PulmV S/D Lauri:    1.51  PulmV Sys Lauri:    40.74 cm/s       AORTA:  Asc Ao Diam 3.23 cm       [id number] Bryon Quiroz DO  Electronically signed on 5/6/2025 at 4:37:10 PM       ** Final **  ECG 12 lead  Atrial fibrillation  Rightward axis  Low voltage QRS  Septal infarct , age undetermined  Abnormal ECG  No previous ECGs available  XR lumbar spine 2-3 views  Narrative: Interpreted By:  Shayla Colvin,   STUDY:  XR LUMBAR SPINE 2-3 VIEWS 5/6/2025 8:15 am      INDICATION:  Signs/Symptoms:anklyosing spondylitis      COMPARISON:  None available.      ACCESSION NUMBER(S):  [id number]      ORDERING CLINICIAN:  DARLENE CARRENO      TECHNIQUE:  AP and cross-table lateral views of the lumbar spine are performed.      FINDINGS:  There is lumbar levoscoliosis noted with a grade 1 spondylolisthesis  of L4 on L5 measuring 3 mm. There is disc space narrowing at the L1-2  level with vacuum disc and at the L5-S1 level as well. There is  reactive sclerosis and osteophytosis of the endplates at L1-2 level  with additional endplate spurring at the remaining lumbar levels.  There is degenerative spondyloarthropathy bilaterally at L5-S1 level  and at L4-5 level greater on the right than on the left. No fracture  or bony destruction is seen.      There is sclerosis about the sacroiliac joints bilaterally, greater  on the left than on the right.      There is calcified plaque within the wall of the abdominal aorta  without aneurysmal dilatation. Postoperative change from  cholecystectomy is noted..      Impression: Lumbar levoscoliosis with degenerative disc space narrowing at L1-2  and L5-S1 levels.      Grade 1 spondylolisthesis of L4 on L5 secondary to degenerative  spondyloarthropathy.      There is moderate lumbar spondylosis.      No ankylosing spondylitis of the lumbosacral spine is seen.      Sclerosis and narrowing of the sacroiliac joints bilaterally  indicating bilateral sacroiliitis.      Signed by: Shayla Colvin 5/6/2025 9:11  AM  Dictation workstation:   [id number]  XR thoracic spine 3 views  Narrative: Interpreted By:  Shayla Colvin,   STUDY:  XR THORACIC SPINE 3 VIEWS 5/6/2025 8:15 am      INDICATION:  Signs/Symptoms:ankylosing spondylitis      COMPARISON:  None available.      ACCESSION NUMBER(S):  [id number]      ORDERING CLINICIAN:  MARQUEZ ALMARAZ      TECHNIQUE:  Three views of the thoracic spine are completed.      FINDINGS:  The vertebral bodies are of normal height with no compression  fractures of the thoracic spine identified. The disc spaces are  maintained. There is some mild osteophytosis of the lower thoracic  endplates seen. No paraspinous soft tissue mass is identified.      Impression: Mild degenerative changes of the lower thoracic spine.      No findings to indicate ankylosing spondylitis in the thoracic region.      Signed by: Shayla Colvin 5/6/2025 9:04 AM  Dictation workstation:   [id number]      Physical Exam    Relevant Results                              Assessment & Plan  Dementia due to Alzheimer's disease (Multi)   she clearly has dementia.  Most likely Alzheimer type.    Atrial fibrillation-  Echo pending.  No need for rate control.  Cardiology to help decide on anticoagulation    Hypertension- she is grossly fluid overloaded with significant leg edema.  I have lowered the amlodipine and added furosemide    Gait issues-addition to dementia she also has osteoarthritis.  x-rays also show sacroiliitis.  And putting her on high-dose Celebrex for now.  PT recommending moderate intensity.  I will discuss this with the family    e             Marquez Almaraz MD

## 2025-05-06 NOTE — PROGRESS NOTES
Occupational Therapy    Evaluation    Patient Name: Nirmala Siddiqi  MRN: [id number]  Department: [address]  Room: [address] [place] [address]  Today's Date: 5/6/2025  Time Calculation  Start Time: 1112  Stop Time: 1130  Time Calculation (min): 18 min        Assessment:  OT Assessment: pt presents with confusion, generalized weakness, reduced endurance and decreased balance which impedes ADL performance. pt would benefit from skilled OT services to address these deficits and to facilitate highest level of independence.  Prognosis: Fair  Barriers to Discharge Home: Caregiver assistance, Cognition needs, Physical needs  Caregiver Assistance: Caregiver assistance needed per identified barriers - however, level of patient's required assistance exceeds assistance available at home  Cognition Needs: 24hr supervision for safety awareness needed, Cognition-related high falls risk  Physical Needs: Stair navigation into home limited by function/safety, Stair navigation to access bed limited by function/safety, Stair navigation to access bath limited by function/safety, 24hr mobility assistance needed, 24hr ADL assistance needed, High falls risk due to function or environment  Evaluation/Treatment Tolerance: Patient limited by fatigue  Medical Staff Made Aware: Yes  End of Session Communication: Bedside nurse  End of Session Patient Position: Bed, 3 rail up, Alarm on  OT Assessment Results: Decreased ADL status, Decreased cognition, Decreased safe judgment during ADL, Decreased endurance, Decreased functional mobility, Decreased trunk control for functional activities  Prognosis: Fair  Evaluation/Treatment Tolerance: Patient limited by fatigue  Medical Staff Made Aware: Yes  Strengths: Attitude of self  Barriers to Participation: Ability to acquire knowledge, Comorbidities  Plan:  Treatment Interventions: ADL retraining, Functional transfer training, UE strengthening/ROM, Endurance training, Patient/family training, Neuromuscular reeducation,  Compensatory technique education  OT Frequency: 3 times per week  OT Discharge Recommendations: Moderate intensity level of continued care  Equipment Recommended upon Discharge: Wheeled walker  OT Recommended Transfer Status: Assist of 1, Minimal assist  OT - OK to Discharge: Yes  Treatment Interventions: ADL retraining, Functional transfer training, UE strengthening/ROM, Endurance training, Patient/family training, Neuromuscular reeducation, Compensatory technique education    Subjective       General:  General  Reason for Referral: ADL impairment; 89 yr old female presenting with progressive weakness and AMS; found to be in afib and hypertensive.  Past Medical History Relevant to Rehab: diverticulosis; ventral hernia; sacriolitis; OA ; HTN; A-fib  Family/Caregiver Present: No  Prior to Session Communication: Bedside nurse  Patient Position Received: Bed, 3 rail up, Alarm on  Preferred Learning Style: verbal, visual  General Comment: pt with significant hard of hearing and tangential throughout session, pt requiring frequent cueing/redirection to tasks  Precautions:  Hearing/Visual Limitations: wears glasses; severe [place]; wears B hearing aides  Medical Precautions: Fall precautions            Pain:  Pain Assessment  Pain Assessment: 0-10  0-10 (Numeric) Pain Score: 0 - No pain    Objective   Cognition:  Overall Cognitive Status: Impaired  Orientation Level: Disoriented to time, Disoriented to place, Disoriented to situation  Following Commands: Follows one step commands with repetition  Cognition Comments: difficult to formally assess due to significant hard of hearing, pt tangential and poor sustained attention, questionable historian           Home Living:  Type of Home: House  Lives With: Significant other  Home Adaptive Equipment: Walker rolling or standard  Home Layout: Multi-level, Laundry in basement  Home Access: Stairs to enter without rails  Entrance Stairs-Rails: None  Entrance Stairs-Number of Steps:  1  Bathroom Shower/Tub: Tub/shower unit, Walk-in shower  Bathroom Toilet: Standard  Bathroom Equipment: None  Bathroom Accessibility: main level  Home Living Comments: pt has 14 stairs with 1 railing to an upstairs tub - but DOES NOT USE; pt also has 12 stairs with 1 railing to basement laundry and walk in shower. pt reported handling laundry and sleeps on main floor  Prior Function:  Level of [place]: Independent with ADLs and functional transfers, Needs assistance with homemaking  Receives Help From:  (S/O)  ADL Assistance: Independent  Homemaking Assistance: Needs assistance  Ambulatory Assistance: Independent (with FWW)  Vocational: Retired  Prior Function Comments: Questionable historian     ADL:  Eating Assistance:  (set up; anticipated)  Grooming Assistance: Minimal (anticipated)  Bathing Assistance: Moderate (anticipated)  UE Dressing Assistance: Moderate (anticipated)  LE Dressing Assistance: Moderate (anticipated)  Toileting Assistance with Device: Maximal (anticipated)  ADL Comments: pt w/ limited ADL participation on this date due to stating that she is very tired and wanting to go back asleep  Activity Tolerance:  Endurance: Decreased tolerance for upright activites  Bed Mobility/Transfers: Bed Mobility  Bed Mobility: Yes  Bed Mobility 1  Bed Mobility 1: Supine to sitting  Level of Assistance 1: Contact guard  Bed Mobility Comments 1: performed with HOB elevated, required cues for sequencing/initiation.  pt able to complete w/o physical assistance however w/ extended time to complete and effortful  Bed Mobility 2  Bed Mobility  2: Sitting to supine  Level of Assistance 2: Minimum assistance  Bed Mobility Comments 2: MIN A for lifting BLE back into bed; effortful    Transfers  Transfer: Yes  Transfer 1  Technique 1: Sit to stand, Stand to sit  Transfer Device 1: Walker  Transfer Level of Assistance 1: Minimum assistance  Trials/Comments 1: from edge of bed, cues for hand placement and positioning.  pt requiring MIN A to ascend into standing position; effortful  Transfers 2  Transfer to 2: Bed  Technique 2: Stand to sit  Transfer Device 2: Walker  Transfer Level of Assistance 2: Minimum assistance  Trials/Comments 2: pt transferred back to bed after functional mobility trial with FWW.  pt requiring MIN A for controlled descent, also required cues for positioning prior to returning to bed      Functional Mobility:  Functional Mobility  Functional Mobility Performed: Yes  Functional Mobility 1  Surface 1: Level tile  Device 1: Rolling walker  Assistance 1: Minimum assistance  Comments 1: pt ambulated min household distances (~10ft around bed) with use of FWW.  pt requiring MIN A for steady assist, pt also complaining of hip pain throughout.  max cues for sequencing throughout and for safe sequencing of walker  Sitting Balance:  Static Sitting Balance  Static Sitting-Balance Support: Feet supported  Static Sitting-Level of Assistance: Close supervision  Standing Balance:  Static Standing Balance  Static Standing-Balance Support: Bilateral upper extremity supported  Static Standing-Level of Assistance: Minimum assistance, Contact guard  Static Standing-Comment/Number of Minutes: ~2 minutes     Vision:Vision - Basic Assessment  Current Vision: No visual deficits  Sensation:  Light Touch: No apparent deficits  Strength:  Strength Comments: BUE grossly 3+/5 based off observation  Perception:  Initiation: Cues to initiate tasks  Coordination:  Movements are Fluid and Coordinated: No  Upper Body Coordination: decreased rate/accuracy of movements   Hand Function:  Gross Grasp: Functional  Coordination: Functional  Extremities: RUE   RUE : Within Functional Limits and LUE   LUE: Within Functional Limits    Outcome Measures:[facility] Daily Activity  Putting on and taking off regular lower body clothing: A lot  Bathing (including washing, rinsing, drying): A lot  Putting on and taking off regular upper body clothing: A  little  Toileting, which includes using toilet, bedpan or urinal: A lot  Taking care of personal grooming such as brushing teeth: A little  Eating Meals: A little  Daily Activity - Total Score: 15        Education Documentation  Body Mechanics, taught by Mitchell Marrufo OT at 5/6/2025 11:59 AM.  Learner: Patient  Readiness: Acceptance  Method: Explanation, Demonstration  Response: Needs Reinforcement  Comment: ADL/functional mobility techniques, facilitating OOB activity, OT POC    ADL Training, taught by Mitchell Marrufo OT at 5/6/2025 11:59 AM.  Learner: Patient  Readiness: Acceptance  Method: Explanation, Demonstration  Response: Needs Reinforcement  Comment: ADL/functional mobility techniques, facilitating OOB activity, OT POC    Education Comments  No comments found.        OP EDUCATION:       Goals:  Encounter Problems       Encounter Problems (Active)       ADLs       Patient with complete upper body dressing with supervision level of assistance donning and doffing all UE clothes  while edge of bed  (Progressing)       Start:  05/06/25    Expected End:  06/06/25            Patient with complete lower body dressing with supervision level of assistance donning and doffing all LE clothes  with PRN adaptive equipment while edge of bed  (Progressing)       Start:  05/06/25    Expected End:  06/06/25            Patient will complete daily grooming tasks with supervision level of assistance and PRN adaptive equipment while standing. (Progressing)       Start:  05/06/25    Expected End:  06/06/25            Patient will complete toileting including hygiene clothing management/hygiene with supervision level of assistance and grab bars. (Progressing)       Start:  05/06/25    Expected End:  06/06/25               MOBILITY       Patient will perform Functional mobility max Household distances/Community Distances with supervision level of assistance and least restrictive device in order to improve safety and functional  mobility. (Progressing)       Start:  05/06/25    Expected End:  06/06/25

## 2025-05-06 NOTE — PROGRESS NOTES
Physical Therapy    Physical Therapy Evaluation & Treatment    Patient Name: Nirmala Siddiqi  MRN: [id number]  Department: [address]  Room: [address] [place] [address]  Today's Date: 5/6/2025   Time Calculation  Start Time: 0846  Stop Time: 0910  Time Calculation (min): 24 min    Assessment/Plan   PT Assessment  PT Assessment Results: Decreased strength, Decreased range of motion, Decreased mobility, Decreased coordination, Impaired balance, Impaired judgement, Decreased cognition, Impaired hearing, Pain  Rehab Prognosis: Good  Barriers to Discharge Home: Physical needs, Cognition needs  Cognition Needs: 24hr supervision for safety awareness needed  Physical Needs: 24hr mobility assistance needed  Evaluation/Treatment Tolerance: Patient limited by fatigue, Patient limited by pain  Medical Staff Made Aware: Yes  Barriers to Participation: Comorbidities  End of Session Communication: Bedside nurse  Assessment Comment: pt would benefit from skilled therapy services to improve strength, balance and overall mobility  End of Session Patient Position: Bed, 4 rail up, Alarm on (call button in reach)  IP OR SWING BED PT PLAN  Inpatient or Swing Bed: Inpatient      PT Plan  Treatment/Interventions: Bed mobility, Transfer training, Gait training, Stair training, Balance training, Strengthening, Endurance training, Therapeutic exercise, Range of motion  PT Plan: Ongoing PT  PT Frequency: 4 times per week  PT Discharge Recommendations: Moderate intensity level of continued care  Equipment Recommended upon Discharge: Wheeled walker  PT Recommended Transfer Status:  (MOD A)  PT - OK to Discharge: Yes    Subjective   General Visit Information:  General  Reason for Referral: pt is a 89 y/o female admitted with 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 pt found to have A-flutter; impaired mobility  Referred By: Dr. Almaraz  Past Medical History Relevant to Rehab: diverticulosis; ventral hernia; sacriolitis; OA ; HTN;  A-fib  Family/Caregiver Present: No  Prior to Session Communication: Bedside nurse  Patient Position Received: Bed, 4 rail up, Alarm on  General Comment: pt cooperative with severe [place]; pt willing to work with therapy      Home Living:  Home Living  Type of Home: House  Lives With:  (boyfriend, Darron)  Home Adaptive Equipment:  (rolling walker)  Home Layout: Multi-level, Laundry in basement  Home Access:  1 entry stair with no railing  Bathroom Shower/Tub: Tub/shower unit, Walk-in shower  Home Living Comments: pt has 14 stairs with 1 railing to an upstairs tub - but DOES NOT USE; pt also has 12 stairs with 1 railing to basement laundry and walk in shower. pt reported handling laundry and sleeps on main floor; *pt is a questionable historian      Prior Level of Function:  Prior Function Per Pt/Caregiver Report  Level of [place]: Independent with ADLs and functional transfers, Needs assistance with homemaking  Receives Help From:  (her boyfriend Darron)  ADL Assistance: Independent  Homemaking Assistance: Needs assistance  Ambulatory Assistance: Independent (using rolling walker per pt; *pt is a quesitonable historian)  Vocational: Retired (teacher)  Prior Function Comments: pt reported managing well but having R LE pain; pts boyfriend can assist when needed    Precautions:  Precautions  Hearing/Visual Limitations: wears glasses; severe [place]; wears B hearing aides  Medical Precautions: Fall precautions             Objective   Pain:  Pain Assessment  Pain Assessment:  (FLACC 3/10 R hip, R knee and LBP during transfers; RN to medicate)    Cognition:  Cognition  Overall Cognitive Status: Impaired at baseline  Orientation Level: Disoriented to time (oriented x 3)  Safety/Judgement:  (impaired safety awareness)  Insight: Moderate  Processing Speed: Delayed    General Assessments:        Activity Tolerance  Endurance:  (FIAR+ activity tolerance)    Sensation  Sensation Comment: denies any  numbness/tingling      Coordination  Coordination Comment: difficulty advancing B LE toward EOB    Postural Control  Posture Comment: rigid trunk; rounded shoulders    Static Sitting Balance  Static Sitting-Comment/Number of Minutes: GOOD-  Dynamic Sitting Balance  Dynamic Sitting-Comments: GOOD-/FAIR+    Static Standing Balance  Static Standing-Comment/Number of Minutes: N/A]      Functional Assessments:  Bed Mobility  Bed Mobility:  (supine <-> sit via log rolling technique with MOD A for trunk, scooting an B LE. pt c.o LBP; transferred pt back to supine)    Transfers  Transfer:  (N/A)       Extremity/Trunk Assessments:  RUE   RUE :  (WFL with 3/5 strength)  LUE   LUE:  (WFL with 3/5 strength)  RLE   RLE :  (WFL with grossly 3/5 strength)  LLE   LLE :  (WFL with 3+/5 strength)      Outcome Measures:  [facility] Basic Mobility  Turning from your back to your side while in a flat bed without using bedrails: A lot  Moving from lying on your back to sitting on the side of a flat bed without using bedrails: A lot  Moving to and from bed to chair (including a wheelchair): A lot  Standing up from a chair using your arms (e.g. wheelchair or bedside chair): A lot  To walk in hospital room: A lot  Climbing 3-5 steps with railing: Total  Basic Mobility - Total Score: 11      Encounter Problems       Encounter Problems (Active)       Mobility       STG - Patient will ambulate 30' x 1 using rolling walker with SBA (Progressing)       Start:  05/06/25    Expected End:  05/20/25            STG - Patient will negotiate 12 stairs using 1 railing with cane and CGA (Progressing)       Start:  05/06/25    Expected End:  05/20/25               PT Transfers       STG - Patient to transfer to and from sit to supine with CGA (Progressing)       Start:  05/06/25    Expected End:  05/20/25            STG - Patient will transfer sit to and from stand with SBA (Progressing)       Start:  05/06/25    Expected End:  05/20/25                    Education Documentation  Precautions, taught by Jose Walsh, PT at 5/6/2025 10:53 AM.  Learner: Patient  Readiness: Eager  Method: Explanation  Response: Verbalizes Understanding, Needs Reinforcement    Mobility Training, taught by Jose Walsh PT at 5/6/2025 10:53 AM.  Learner: Patient  Readiness: Eager  Method: Explanation  Response: Verbalizes Understanding, Needs Reinforcement    Education Comments  No comments found.

## 2025-05-06 NOTE — PROGRESS NOTES
Pt arrived to unit. All belongings with patient. Call light placed within reach. All safety precautions initiated.

## 2025-05-06 NOTE — CARE PLAN
The patient's goals for the shift include MAINTAIN SAFETY,PAIN CTRL    The clinical goals for the shift include MONITOR HR/RHYTHM    Over the shift, the patient did make progress toward the following goals. Barriers to progression include INABILITY TO FOLLOW PLAN OF CARE. Recommendations to address these barriers include PT EDUCATION.

## 2025-05-06 NOTE — CONSULTS
"Inpatient consult to Cardiology  Consult performed by: RAZIA Baptiste-CNP  Consult ordered by: Marquez Almaraz MD  Reason for consult: Atrial Fibrillation        History Of Present Illness:    Nirmala Siddiqi is a 89 y.o. female presenting with multiple complaints. She does not follow with a cardiologist. Patient has underlying dementia and is unable to provide history, so history completed via chart review. Reportedly, the patient has had a decline in mental status as well as her ability to ambulate. She reportedly also had abdominal pain a few days ago which has since resolved. No complaints of chest pain, pressure or palpitations. No nausea or vomiting. Denies shortness of breath. No EKG was completed in the emergency department.  Lab work the emergency department revealing sodium of 138, potassium 3.8, creatinine 0.62 and hemoglobin of 14.6.  CT of the head with no acute intracranial pathology.  CT abdomen pelvis with no acute findings in the abdomen pelvis, fluid-filled moderately distended urinary bladder, large fat and bowel containing ventral hernia and colonic diverticulosis without reticulitis.  X-ray of the bilateral hips revealing bilateral sacroiliitis greater on the left than on the right and mild osteoarthritis of the left hip with advanced osteoarthritis of the right hip. Cardiology were consulted due to new onset atrial fibrillation.     Review of Systems   Reason unable to perform ROS: Underlying dementia.         Last Recorded Vitals:  Vitals:    05/06/25 0300 05/06/25 0400 05/06/25 0500 05/06/25 0613   BP: 124/60 114/89 101/54 153/84   Pulse: 75 87 73 70   Resp: 14 (!) 21 13 13   Temp:       TempSrc:       SpO2:       Weight:       Height:           Last Labs:  CBC - 5/5/2025: 12:17 PM  11.5 14.6 232    42.4      CMP - 5/6/2025:  6:03 AM  9.1 6.8 17 --- 1.0   _ 4.0 13 63      PTT - No results in last year.  _   _ _     No results found for: \"TROPHS\", \"BNP\", \"HGBA1C\", \"LDLCALC\", \"VLDL\"   Last " "I/O:  I/O last 3 completed shifts:  In: 50 (0.6 mL/kg) [I.V.:50 (0.6 mL/kg)]  Out: 3000 (35.8 mL/kg) [Urine:3000 (1 mL/kg/hr)]  Weight: 83.9 kg     Past Cardiology Tests (Last 3 Years):  EKG:  No results found for this or any previous visit from the past 1095 days.    Echo:  No results found for this or any previous visit from the past 1095 days.    Ejection Fractions:  No results found for: \"EF\"  Cath:  No results found for this or any previous visit from the past 1095 days.    Stress Test:  No results found for this or any previous visit from the past 1095 days.    Cardiac Imaging:  No results found for this or any previous visit from the past 1095 days.      Past Medical History:  She has no past medical history on file.    Past Surgical History:  She has no past surgical history on file.      Social History:  She has no history on file for tobacco use, alcohol use, and drug use.    Family History:  Family History[1]     Allergies:  Codeine    Inpatient Medications:  Scheduled Medications[2]  PRN Medications[3]  Continuous Medications[4]  Outpatient Medications:  Current Outpatient Medications   Medication Instructions    acetaminophen (TYLENOL) 500 mg, Nightly    amLODIPine-benazepriL (Lotrel) 5-10 mg capsule 1 capsule, Daily    omeprazole (PRILOSEC) 40 mg, Daily before breakfast     Physical Exam  Vitals reviewed.   Constitutional:       General: She is not in acute distress.  HENT:      Head: Normocephalic and atraumatic.   Cardiovascular:      Rate and Rhythm: Normal rate. Rhythm irregular.      Heart sounds: No murmur heard.     No friction rub. No gallop.   Pulmonary:      Effort: Pulmonary effort is normal.      Breath sounds: Normal breath sounds. No wheezing, rhonchi or rales.   Abdominal:      General: Bowel sounds are normal.      Palpations: Abdomen is soft.   Musculoskeletal:      Right lower leg: No edema.      Left lower leg: No edema.   Skin:     General: Skin is warm and dry.      Capillary " Refill: Capillary refill takes less than 2 seconds.   Neurological:      Mental Status: She is alert.      Comments: Very limited historian. Follows simple commands. Hard of hearing.             Assessment/Plan   Atrial Fibrillation: EKG completed this morning revealing new onset rate controlled atrial fibrillation with no evidence of ischemia.  Given this patient is 89 years old and is rate controlled without any rate lowering agents, would not initiating any rate limiting agents at this time.  Echocardiogram has been ordered and will be completed today.  Regarding anticoagulation, the patient's EPQ9DR2-SARh score is 4 giving her a 4.8% risk of stroke in the next year, however given her age, underlying dementia and issues with ambulation I am not sure if the benefits outweigh the risks of initiating anticoagulation therapy is appropriate at this time.  Will need to have further discussion with the patient's family.    Difficulty Ambulating: Per admitting    Dementia: Per admitting    Hypertensive Disorder: Blood pressure mildly elevated this morning at 141/71.  Continue amlodipine 5 mg daily.    Peripheral IV 05/05/25 20 G Anterior;Right Forearm (Active)   Site Assessment Clean;Dry;Intact 05/05/25 1218   Dressing Type Transparent 05/05/25 1218   Line Status Flushed 05/05/25 1218   Dressing Status Clean;Dry;Occlusive 05/05/25 1218   Number of days: 1       Code Status:  No Order          HASMUKH Baptiste         [1] No family history on file.  [2]   Scheduled medications   Medication Dose Route Frequency    acetaminophen  650 mg oral TID    amLODIPine  5 mg oral Daily    furosemide  40 mg oral Daily    heparin  5,000 Units subcutaneous q12h    pantoprazole  40 mg oral Daily before breakfast   [3]   PRN medications   Medication    traMADol   [4]   Continuous Medications   Medication Dose Last Rate

## 2025-05-06 NOTE — ASSESSMENT & PLAN NOTE
she clearly has dementia.  Most likely Alzheimer type.    Atrial fibrillation-  Echo pending.  No need for rate control.  Cardiology to help decide on anticoagulation    Hypertension- she is grossly fluid overloaded with significant leg edema.  I have lowered the amlodipine and added furosemide    Gait issues-addition to dementia she also has osteoarthritis.  x-rays also show sacroiliitis.  And putting her on high-dose Celebrex for now.  PT recommending moderate intensity.  I will discuss this with the family    e

## 2025-05-07 LAB
ANION GAP SERPL CALCULATED.3IONS-SCNC: 13 MMOL/L (ref 10–20)
BUN SERPL-MCNC: 19 MG/DL (ref 6–23)
CALCIUM SERPL-MCNC: 9.5 MG/DL (ref 8.6–10.3)
CHLORIDE SERPL-SCNC: 102 MMOL/L (ref 98–107)
CO2 SERPL-SCNC: 26 MMOL/L (ref 21–32)
CREAT SERPL-MCNC: 0.73 MG/DL (ref 0.5–1.05)
EGFRCR SERPLBLD CKD-EPI 2021: 77 ML/MIN/1.73M*2
GLUCOSE SERPL-MCNC: 103 MG/DL (ref 74–99)
POTASSIUM SERPL-SCNC: 3.6 MMOL/L (ref 3.5–5.3)
SODIUM SERPL-SCNC: 137 MMOL/L (ref 136–145)

## 2025-05-07 PROCEDURE — 97116 GAIT TRAINING THERAPY: CPT | Mod: GP

## 2025-05-07 PROCEDURE — 97535 SELF CARE MNGMENT TRAINING: CPT | Mod: GO

## 2025-05-07 PROCEDURE — 2500000001 HC RX 250 WO HCPCS SELF ADMINISTERED DRUGS (ALT 637 FOR MEDICARE OP): Performed by: INTERNAL MEDICINE

## 2025-05-07 PROCEDURE — 2500000001 HC RX 250 WO HCPCS SELF ADMINISTERED DRUGS (ALT 637 FOR MEDICARE OP)

## 2025-05-07 PROCEDURE — 2500000004 HC RX 250 GENERAL PHARMACY W/ HCPCS (ALT 636 FOR OP/ED): Performed by: INTERNAL MEDICINE

## 2025-05-07 PROCEDURE — 1210000001 HC SEMI-PRIVATE ROOM DAILY

## 2025-05-07 PROCEDURE — 36415 COLL VENOUS BLD VENIPUNCTURE: CPT | Performed by: INTERNAL MEDICINE

## 2025-05-07 PROCEDURE — 80048 BASIC METABOLIC PNL TOTAL CA: CPT | Performed by: INTERNAL MEDICINE

## 2025-05-07 PROCEDURE — 99232 SBSQ HOSP IP/OBS MODERATE 35: CPT | Performed by: INTERNAL MEDICINE

## 2025-05-07 PROCEDURE — 2500000002 HC RX 250 W HCPCS SELF ADMINISTERED DRUGS (ALT 637 FOR MEDICARE OP, ALT 636 FOR OP/ED): Performed by: INTERNAL MEDICINE

## 2025-05-07 RX ORDER — METOPROLOL SUCCINATE 25 MG/1
25 TABLET, EXTENDED RELEASE ORAL DAILY
Status: DISCONTINUED | OUTPATIENT
Start: 2025-05-07 | End: 2025-05-10 | Stop reason: HOSPADM

## 2025-05-07 RX ADMIN — POTASSIUM CHLORIDE 20 MEQ: 1500 TABLET, EXTENDED RELEASE ORAL at 08:15

## 2025-05-07 RX ADMIN — METOPROLOL SUCCINATE 25 MG: 25 TABLET, EXTENDED RELEASE ORAL at 11:45

## 2025-05-07 RX ADMIN — AMLODIPINE BESYLATE 5 MG: 5 TABLET ORAL at 08:15

## 2025-05-07 RX ADMIN — ACETAMINOPHEN 650 MG: 325 TABLET ORAL at 15:41

## 2025-05-07 RX ADMIN — TRAMADOL HYDROCHLORIDE 50 MG: 50 TABLET, COATED ORAL at 20:51

## 2025-05-07 RX ADMIN — CELECOXIB 200 MG: 200 CAPSULE ORAL at 08:15

## 2025-05-07 RX ADMIN — HEPARIN SODIUM 5000 UNITS: 5000 INJECTION INTRAVENOUS; SUBCUTANEOUS at 08:15

## 2025-05-07 RX ADMIN — FUROSEMIDE 40 MG: 40 TABLET ORAL at 08:15

## 2025-05-07 RX ADMIN — CELECOXIB 200 MG: 200 CAPSULE ORAL at 20:50

## 2025-05-07 RX ADMIN — PANTOPRAZOLE SODIUM 40 MG: 40 TABLET, DELAYED RELEASE ORAL at 05:36

## 2025-05-07 RX ADMIN — ACETAMINOPHEN 650 MG: 325 TABLET ORAL at 08:15

## 2025-05-07 RX ADMIN — HEPARIN SODIUM 5000 UNITS: 5000 INJECTION INTRAVENOUS; SUBCUTANEOUS at 20:51

## 2025-05-07 RX ADMIN — ACETAMINOPHEN 650 MG: 325 TABLET ORAL at 20:50

## 2025-05-07 ASSESSMENT — PAIN SCALES - GENERAL
PAINLEVEL_OUTOF10: 0 - NO PAIN
PAINLEVEL_OUTOF10: 0 - NO PAIN

## 2025-05-07 ASSESSMENT — COGNITIVE AND FUNCTIONAL STATUS - GENERAL
TURNING FROM BACK TO SIDE WHILE IN FLAT BAD: A LOT
HELP NEEDED FOR BATHING: A LOT
MOBILITY SCORE: 12
EATING MEALS: A LITTLE
MOVING TO AND FROM BED TO CHAIR: A LOT
DAILY ACTIVITIY SCORE: 15
DRESSING REGULAR LOWER BODY CLOTHING: A LOT
MOVING FROM LYING ON BACK TO SITTING ON SIDE OF FLAT BED WITH BEDRAILS: A LOT
CLIMB 3 TO 5 STEPS WITH RAILING: TOTAL
DRESSING REGULAR UPPER BODY CLOTHING: A LITTLE
TOILETING: A LOT
WALKING IN HOSPITAL ROOM: A LITTLE
STANDING UP FROM CHAIR USING ARMS: A LOT
PERSONAL GROOMING: A LITTLE

## 2025-05-07 ASSESSMENT — PAIN - FUNCTIONAL ASSESSMENT
PAIN_FUNCTIONAL_ASSESSMENT: 0-10

## 2025-05-07 ASSESSMENT — ACTIVITIES OF DAILY LIVING (ADL): HOME_MANAGEMENT_TIME_ENTRY: 15

## 2025-05-07 NOTE — PROGRESS NOTES
Nirmala Patrick is a 89 y.o. 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      Subjective   Patient's at baseline mental status which is a bit disoriented and demented.  Cardiology has signed off.  We are working on a skilled disposition.  I put her on diuretics for fluid retention and Celebrex for sacroiliitis.       Objective   She is alert cooperative  Heart irregular  Lungs clear  Abdomen soft nontender nondistended  Extremities 1+ pitting bilateral leg edema    Last Recorded Vitals  BP (!) 150/93 (BP Location: Left arm, Patient Position: Lying) Comment: nurse notified  Pulse 95   Temp 36.8 °C (98.2 °F) (Oral)   Resp 17   Wt 83.9 kg (185 lb)   SpO2 96%   Intake/Output last 3 Shifts:    Intake/Output Summary (Last 24 hours) at 5/7/2025 1433  Last data filed at 5/6/2025 1932  Gross per 24 hour   Intake 250 ml   Output --   Net 250 ml       Admission Weight  Weight: 83.9 kg (185 lb) (05/05/25 1154)    Daily Weight  05/05/25 : 83.9 kg (185 lb)    Image Results  ECG 12 lead  Atrial fibrillation  Rightward axis  Low voltage QRS  Septal infarct , age undetermined  Abnormal ECG    Confirmed by Duane Mayo ([id number]) on 5/6/2025 10:57:43 AM      Physical Exam                       Assessment & Plan  Dementia due to Alzheimer's disease (Multi)   she clearly has dementia.  Most likely Alzheimer type.    Atrial fibrillation-cardiology has signed off.  No anticoagulation.    Hypertension- she is grossly fluid overloaded with significant leg edema.  I have lowered the amlodipine and added furosemide.  Check chemistry panel tomorrow    Gait issues-addition to dementia she also has osteoarthritis.  x-rays also show sacroiliitis.  And putting her on high-dose Celebrex for now.  PT recommending moderate intensity.  I will discuss this with the family    Case management has talked with Radha her POA daughter and we are working on a skilled rehab facility.  I have discussed all of  the above with the patient's other daughter who is present today Tanja.             Marquez Almaraz MD

## 2025-05-07 NOTE — ASSESSMENT & PLAN NOTE
she clearly has dementia.  Most likely Alzheimer type.    Atrial fibrillation-cardiology has signed off.  No anticoagulation.    Hypertension- she is grossly fluid overloaded with significant leg edema.  I have lowered the amlodipine and added furosemide.  Check chemistry panel tomorrow    Gait issues-addition to dementia she also has osteoarthritis.  x-rays also show sacroiliitis.  And putting her on high-dose Celebrex for now.  PT recommending moderate intensity.  I will discuss this with the family    Case management has talked with Radha her SUSHANT daughter and we are working on a skilled rehab facility.  I have discussed all of the above with the patient's other daughter who is present today Tanja.

## 2025-05-07 NOTE — PROGRESS NOTES
Occupational Therapy    Occupational Therapy Treatment    Name: Nirmala Patrick  MRN: [id number]  Department: [address]  Room: [address] [place] [address]  Date: 05/07/25  Time Calculation  Start Time: 1335  Stop Time: 1350  Time Calculation (min): 15 min    Assessment:  OT Assessment: Patient tolerated treatmetn session fairly, limited during session secondary to [place] and decreased cog. Patient to benefit from continued skilled OT to maximize functional independence and decrease burden of care.  Prognosis: Fair  Barriers to Discharge Home: Caregiver assistance, Cognition needs, Physical needs  Caregiver Assistance: Caregiver assistance needed per identified barriers - however, level of patient's required assistance exceeds assistance available at home  Cognition Needs: 24hr supervision for safety awareness needed, Cognition-related high falls risk  Physical Needs: Stair navigation into home limited by function/safety, Stair navigation to access bed limited by function/safety, Stair navigation to access bath limited by function/safety, 24hr mobility assistance needed, 24hr ADL assistance needed, High falls risk due to function or environment  Evaluation/Treatment Tolerance: Patient limited by fatigue  Medical Staff Made Aware: Yes  End of Session Communication: Bedside nurse  End of Session Patient Position: Up in chair, Alarm on (call light within reach.)  Plan:  Treatment Interventions: ADL retraining, Functional transfer training, UE strengthening/ROM, Cognitive reorientation, Patient/family training, Compensatory technique education  OT Frequency: 3 times per week  OT Discharge Recommendations: Moderate intensity level of continued care  Equipment Recommended upon Discharge: Wheeled walker  OT Recommended Transfer Status: Assist of 1  OT - OK to Discharge: Yes    Subjective   Previous Visit Info:  OT Last Visit  OT Received On: 05/07/25  General:  General  Reason for Referral: impaired activities of daily living d/t  dementia  Referred By: Dr. Sung MD  Past Medical History Relevant to Rehab: diverticulosis; ventral hernia; sacriolitis; OA ; HTN; A-fib  Family/Caregiver Present: Yes (Darron)  Prior to Session Communication: Bedside nurse  Patient Position Received: Up in chair, Alarm on  Preferred Learning Style: verbal, visual  General Comment: Cleared by RN for therapy, Pt agreeable for OT treatment  Precautions:  Hearing/Visual Limitations: wears glasses; severe [place]; wears B hearing aides  Medical Precautions: Fall precautions           Pain Assessment:  Pain Assessment  Pain Assessment: 0-10  0-10 (Numeric) Pain Score: 0 - No pain    Objective   Cognition:  Overall Cognitive Status: Impaired  Orientation Level: Disoriented to situation, Disoriented to time  Activities of Daily Living: Grooming  Grooming Level of Assistance: Setup  Grooming Where Assessed: Chair  Grooming Comments: pt able to groom hair while sitting in chair with Set/up. therapist washed hair for patient per request while sitting in chair in front of sink.       Bed Mobility/Transfers: Transfers  Transfer: Yes  Transfer 1  Transfer From 1: Chair without arms to  Transfer to 1: Stand  Technique 1: Sit to stand, Stand to sit  Transfer Device 1: Walker  Transfer Level of Assistance 1: Moderate assistance  Trials/Comments 1: sit/stand from chair with RW in front with cues for hand placement within no carryover noted- requiring modA for transitional movement with extended time for direction following d/t [place] and dementia.        Outcome Measures:  [facility] Daily Activity  Putting on and taking off regular lower body clothing: A lot  Bathing (including washing, rinsing, drying): A lot  Putting on and taking off regular upper body clothing: A little  Toileting, which includes using toilet, bedpan or urinal: A lot  Taking care of personal grooming such as brushing teeth: A little  Eating Meals: A little  Daily Activity - Total Score: 15        Education  Documentation  ADL Training, taught by Flavia Petty OT at 5/7/2025  2:16 PM.  Learner: Patient  Readiness: Eager  Method: Explanation  Response: Verbalizes Understanding  Comment: educated pt on call light use.    Education Comments  Educated pt on call light use, fall precautions and OT POC      Goals:  Encounter Problems       Encounter Problems (Active)       ADLs       Patient with complete upper body dressing with supervision level of assistance donning and doffing all UE clothes  while edge of bed  (Progressing)       Start:  05/06/25    Expected End:  06/06/25            Patient with complete lower body dressing with supervision level of assistance donning and doffing all LE clothes  with PRN adaptive equipment while edge of bed  (Progressing)       Start:  05/06/25    Expected End:  06/06/25            Patient will complete daily grooming tasks with supervision level of assistance and PRN adaptive equipment while standing. (Progressing)       Start:  05/06/25    Expected End:  06/06/25            Patient will complete toileting including hygiene clothing management/hygiene with supervision level of assistance and grab bars. (Progressing)       Start:  05/06/25    Expected End:  06/06/25               MOBILITY       Patient will perform Functional mobility max Household distances/Community Distances with supervision level of assistance and least restrictive device in order to improve safety and functional mobility. (Progressing)       Start:  05/06/25    Expected End:  06/06/25

## 2025-05-07 NOTE — PROGRESS NOTES
left for Ifrah who is listed as patients POA     [facility] Spoke to Ifrah patients POA received POA paperwork vie email and will be scanned into system, agreeable to SNF at this time list sent to review via email [email or web address] Ifrah also interested in private pay caregivers for after SNF, list emailed as well     Precert needed prior to discharge   ** do not discharge without speaking to care coordination**    Kaitlin Dubon RN

## 2025-05-07 NOTE — PROGRESS NOTES
Physical Therapy    Physical Therapy Treatment    Patient Name: Nirmala Patrick  MRN: [id number]  Department: [address]  Room: [address] [place] [address]  Today's Date: 5/7/2025  Time Calculation  Start Time: 1326  Stop Time: 1340  Time Calculation (min): 14 min    Assessment/Plan   PT Assessment  PT Assessment Results: Decreased strength, Decreased endurance, Impaired balance, Decreased mobility, Decreased coordination, Decreased cognition, Impaired judgement, Decreased safety awareness  Rehab Prognosis: Good  Barriers to Discharge Home: Caregiver assistance, Cognition needs, Physical needs  Caregiver Assistance: Caregiver assistance needed per identified barriers - however, level of patient's required assistance exceeds assistance available at home  Cognition Needs: 24hr supervision for safety awareness needed  Physical Needs: 24hr mobility assistance needed  Evaluation/Treatment Tolerance: Patient tolerated treatment well  Medical Staff Made Aware: Yes  Strengths: Attitude of self  Barriers to Participation: Comorbidities, Ability to acquire knowledge  End of Session Communication: Bedside nurse  Assessment Comment: Improved gait quality/tolerance and transfer status; cueing throughout for sequencing/safety with limited carryover demonstrated; remains a high falls risk at this time; will require 24 hr supervision/assist upon d/c.  End of Session Patient Position: Up in chair, Alarm on  PT Plan  Inpatient/Swing Bed or Outpatient: Inpatient  PT Plan  Treatment/Interventions: Bed mobility, Transfer training, Gait training, Balance training, Neuromuscular re-education, Strengthening, Endurance training, Therapeutic exercise, Therapeutic activity  PT Plan: Ongoing PT  PT Frequency: 4 times per week  PT Discharge Recommendations: Moderate intensity level of continued care, No PT needed after discharge  Equipment Recommended upon Discharge: Wheeled walker  PT Recommended Transfer Status: Assist x1, Assistive device  PT - OK to Discharge:  "Yes    General Visit Information:   PT  Visit  PT Received On: 05/07/25  Response to Previous Treatment: Patient with no complaints from previous session.  General  Reason for Referral: impaired functional mobility d/t dementia  Referred By: Dr. Sung MD  Past Medical History Relevant to Rehab: diverticulosis; ventral hernia; sacriolitis; OA ; HTN; A-fib  Family/Caregiver Present: Yes  Caregiver Feedback: friend \"Darron\" present at beginning of session  Co-Treatment: OT  Co-Treatment Reason: partial co-tx to optimize pt outcomes/safety/participation  Prior to Session Communication: Bedside nurse  Patient Position Received: Up in chair, Alarm on  Preferred Learning Style: verbal  General Comment: Pt cleared for therapy via RN, received in sitting, NAD, agreeable to participate.    Subjective   Precautions:  Precautions  Hearing/Visual Limitations: glasses; severe [place]; B hearing aids  Medical Precautions: Fall precautions    Objective   Pain:  Pain Assessment  Pain Assessment: 0-10  0-10 (Numeric) Pain Score: 0 - No pain  Cognition:  Cognition  Overall Cognitive Status: Impaired, Impaired at baseline  Orientation Level: Disoriented to time, Disoriented to situation  Following Commands: Follows one step commands with repetition  Cognition Comments: significantly [place], perseverating, pleasant/cooperative  Insight: Severe  Coordination:  Movements are Fluid and Coordinated: No  Coordination Comment: decreased rate/accuracy of movements  Postural Control:  Postural Control  Postural Control: Impaired  Posture Comment: forward head, increased thoracic kyphosis  Static Sitting Balance  Static Sitting-Balance Support: Feet supported, Bilateral upper extremity supported  Static Sitting-Level of Assistance: Close supervision  Static Standing Balance  Static Standing-Balance Support: Bilateral upper extremity supported  Static Standing-Level of Assistance: Minimum assistance  Extremity/Trunk Assessments:  RLE   RLE :  (grossly " WFL)  LLE   LLE :  (grossly WFL)  Activity Tolerance:  Activity Tolerance  Endurance: Tolerates less than 10 min exercise, no significant change in vital signs  Activity Tolerance Comments: fair  Treatments:  Therapeutic Exercise  Therapeutic Exercise Performed: No    Bed Mobility  Bed Mobility: No    Ambulation/Gait Training  Ambulation/Gait Training Performed: Yes  Ambulation/Gait Training 1  Surface 1: Level tile  Device 1: Rolling walker  Assistance 1: Minimum assistance  Quality of Gait 1: Narrow base of support, Decreased step length, Diminished heel strike, Forward flexed posture  Comments/Distance (ft) 1: 10' x 2  Transfers  Transfer: Yes  Transfer 1  Transfer From 1: Sit to, Stand to  Transfer to 1: Sit, Stand  Technique 1: Sit to stand, Stand to sit  Transfer Device 1: Walker  Transfer Level of Assistance 1: Moderate assistance  Trials/Comments 1: assist for forward weight shift to stand; assist for controlled lowering of hips into chair; cueing throughout for sequencing/safety with limited carryover demonstrated    Stairs  Stairs: No    Outcome Measures:  [facility] Basic Mobility  Turning from your back to your side while in a flat bed without using bedrails: A lot  Moving from lying on your back to sitting on the side of a flat bed without using bedrails: A lot  Moving to and from bed to chair (including a wheelchair): A lot  Standing up from a chair using your arms (e.g. wheelchair or bedside chair): A lot  To walk in hospital room: A little  Climbing 3-5 steps with railing: Total  Basic Mobility - Total Score: 12    Education Documentation  Precautions, taught by Lali Ni PT at 5/7/2025  2:15 PM.  Learner: Patient  Readiness: Acceptance  Method: Explanation, Demonstration  Response: Needs Reinforcement  Comment: educated on PT POC, safety/sequencing during functional mobility training    Mobility Training, taught by Lali Ni PT at 5/7/2025  2:15 PM.  Learner: Patient  Readiness:  Acceptance  Method: Explanation, Demonstration  Response: Needs Reinforcement  Comment: educated on PT POC, safety/sequencing during functional mobility training    Education Comments  No comments found.      Encounter Problems       Encounter Problems (Active)       Mobility       STG - Patient will ambulate 30' x 1 using rolling walker with SBA (Progressing)       Start:  05/06/25    Expected End:  05/20/25            STG - Patient will negotiate 12 stairs using 1 railing with cane and CGA (Not Progressing)       Start:  05/06/25    Expected End:  05/20/25               PT Transfers       STG - Patient to transfer to and from sit to supine with CGA (Progressing)       Start:  05/06/25    Expected End:  05/20/25            STG - Patient will transfer sit to and from stand with SBA (Progressing)       Start:  05/06/25    Expected End:  05/20/25

## 2025-05-07 NOTE — PROGRESS NOTES
"Nirmala Patrick is a 89 y.o. 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    Subjective   Patient sitting up on side of bed.  Denies chest pain, pressure or palpitations.  Remains confused about recent events.       Objective     Physical Exam  Vitals reviewed.   HENT:      Head: Normocephalic and atraumatic.   Cardiovascular:      Rate and Rhythm: Normal rate. Rhythm irregular.      Heart sounds: No murmur heard.     No friction rub. No gallop.   Pulmonary:      Effort: Pulmonary effort is normal.      Breath sounds: Normal breath sounds.      Comments: No conversational dyspnea noted.  Abdominal:      General: Bowel sounds are normal.      Palpations: Abdomen is soft.   Musculoskeletal:      Right lower leg: No edema.      Left lower leg: No edema.   Skin:     General: Skin is warm and dry.      Capillary Refill: Capillary refill takes less than 2 seconds.   Neurological:      Mental Status: She is alert. Mental status is at baseline.      Comments: Poor recall of recent events.         Last Recorded Vitals  Blood pressure (!) 150/93, pulse 95, temperature 36.8 °C (98.2 °F), temperature source Oral, resp. rate 17, height 1.626 m (5' 4\"), weight 83.9 kg (185 lb), SpO2 96%.  Intake/Output last 3 Shifts:  I/O last 3 completed shifts:  In: 300 (3.6 mL/kg) [P.O.:250; I.V.:50 (0.6 mL/kg)]  Out: 1800 (21.5 mL/kg) [Urine:1800 (0.6 mL/kg/hr)]  Weight: 83.9 kg     Relevant Results  Results for orders placed or performed during the hospital encounter of 05/05/25 (from the past 24 hours)   Transthoracic Echo Complete   Result Value Ref Range    AV pk deni 1.22 m/s    LVOT diam 1.93 cm    MV E/A ratio 4.59     Tricuspid annular plane systolic excursion 1.3 cm    LV Biplane EF 57 %    LA vol index A/L 48.5 ml/m2    MV avg E/e' ratio 10.07     LV EF 63 %    RV free wall pk S' 10.00 cm/s    RVSP 22.9 mmHg    LVIDd 4.04 cm    AV pk grad 6 mmHg    Aortic Valve Area by Continuity of Peak " Velocity 2.64 cm2    LV A4C EF 59.3    Basic Metabolic Panel   Result Value Ref Range    Glucose 103 (H) 74 - 99 mg/dL    Sodium 137 136 - 145 mmol/L    Potassium 3.6 3.5 - 5.3 mmol/L    Chloride 102 98 - 107 mmol/L    Bicarbonate 26 21 - 32 mmol/L    Anion Gap 13 10 - 20 mmol/L    Urea Nitrogen 19 6 - 23 mg/dL    Creatinine 0.73 0.50 - 1.05 mg/dL    eGFR 77 >60 mL/min/1.73m*2    Calcium 9.5 8.6 - 10.3 mg/dL          Assessment & Plan  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 No anticoagulation per conversation with daughter Tanja.   Difficulty Ambulating: Per admitting  Dementia: Per admitting  Hypertensive Disorder: Continue amlodipine and furosemide. Adding low dose metoprolol succinate 25 mg this morning.     Impression and Plan:    5/7: As above.  Patient sitting up on side of bed this morning. She denies chest pain, pressure or palpitations.  Remains confused about recent events.  Blood pressures mildly elevated with last recorded 150/93.  I am going to initiate low-dose metoprolol succinate 25 mg daily as the patient's ventricular rates are mildly elevated today between 100 and 110.  Patient breathing comfortably on room air with pulse oximetry 96%.  She does appear euvolemic on exam.  Reviewed lab work which was completed this morning revealing a sodium of 137, potassium 3.6, creatinine 0.73.  Echocardiogram completed yesterday revealing preserved ejection fraction of 60 to 65%, moderate mitral valve regurgitation and no regional wall motion abnormalities.  I attempted to call the patient's daughter Radha who is listed as her power of  but was unable to get through.  I did speak with her daughter Tanja on the phone who is listed as an alternative emergency contact regarding the patient's new diagnosis of atrial fibrillation.  I did discuss with her that the patient's [id number] score is 4 points  giving her 4.8% risk of stroke within the next year.  She reports understanding of this but at this point is in agreement that anticoagulation should not be initiated given her frequent falls and underlying dementia.  Otherwise no further recommendations in the cardiac perspective and we will sign off at this time.  Please do not hesitate to reach out with further questions or concerns.  The patient can follow-up with Dr. Bautista in the outpatient setting in 3 to 4 weeks following discharge.      Magalie Melvin, APRN-CNP

## 2025-05-07 NOTE — CARE PLAN
The patient's goals for the shift include MAINTAIN SAFETY,PAIN CTRL    The clinical goals for the shift include remain free from falls    Over the shift, the patient did not make progress toward the following goals. Barriers to progression include orientation status. Recommendations to address these barriers include reorient and falls precatuions.

## 2025-05-07 NOTE — CARE PLAN
The patient's goals for the shift include MAINTAIN SAFETY,PAIN CTRL    The clinical goals for the shift include remain free from injury today    Over the shift, the patient did not make progress toward the following goals. Barriers to progression include . Recommendations to address these barriers include .

## 2025-05-08 LAB
ANION GAP SERPL CALCULATED.3IONS-SCNC: 12 MMOL/L (ref 10–20)
BASOPHILS # BLD AUTO: 0.02 X10*3/UL (ref 0–0.1)
BASOPHILS NFR BLD AUTO: 0.3 %
BUN SERPL-MCNC: 27 MG/DL (ref 6–23)
CALCIUM SERPL-MCNC: 9.8 MG/DL (ref 8.6–10.3)
CHLORIDE SERPL-SCNC: 100 MMOL/L (ref 98–107)
CO2 SERPL-SCNC: 29 MMOL/L (ref 21–32)
CREAT SERPL-MCNC: 0.87 MG/DL (ref 0.5–1.05)
EGFRCR SERPLBLD CKD-EPI 2021: 63 ML/MIN/1.73M*2
EOSINOPHIL # BLD AUTO: 0.48 X10*3/UL (ref 0–0.4)
EOSINOPHIL NFR BLD AUTO: 6.1 %
ERYTHROCYTE [DISTWIDTH] IN BLOOD BY AUTOMATED COUNT: 13.7 % (ref 11.5–14.5)
GLUCOSE SERPL-MCNC: 85 MG/DL (ref 74–99)
HCT VFR BLD AUTO: 43.5 % (ref 36–46)
HGB BLD-MCNC: 14.3 G/DL (ref 12–16)
IMM GRANULOCYTES # BLD AUTO: 0.03 X10*3/UL (ref 0–0.5)
IMM GRANULOCYTES NFR BLD AUTO: 0.4 % (ref 0–0.9)
LYMPHOCYTES # BLD AUTO: 2.17 X10*3/UL (ref 0.8–3)
LYMPHOCYTES NFR BLD AUTO: 27.4 %
MCH RBC QN AUTO: 30.3 PG (ref 26–34)
MCHC RBC AUTO-ENTMCNC: 32.9 G/DL (ref 32–36)
MCV RBC AUTO: 92 FL (ref 80–100)
MONOCYTES # BLD AUTO: 0.4 X10*3/UL (ref 0.05–0.8)
MONOCYTES NFR BLD AUTO: 5.1 %
NEUTROPHILS # BLD AUTO: 4.81 X10*3/UL (ref 1.6–5.5)
NEUTROPHILS NFR BLD AUTO: 60.7 %
NRBC BLD-RTO: 0 /100 WBCS (ref 0–0)
PLATELET # BLD AUTO: 297 X10*3/UL (ref 150–450)
POTASSIUM SERPL-SCNC: 4.2 MMOL/L (ref 3.5–5.3)
RBC # BLD AUTO: 4.72 X10*6/UL (ref 4–5.2)
SODIUM SERPL-SCNC: 137 MMOL/L (ref 136–145)
WBC # BLD AUTO: 7.9 X10*3/UL (ref 4.4–11.3)

## 2025-05-08 PROCEDURE — 97116 GAIT TRAINING THERAPY: CPT | Mod: GP,CQ

## 2025-05-08 PROCEDURE — 36415 COLL VENOUS BLD VENIPUNCTURE: CPT

## 2025-05-08 PROCEDURE — 2500000001 HC RX 250 WO HCPCS SELF ADMINISTERED DRUGS (ALT 637 FOR MEDICARE OP): Performed by: INTERNAL MEDICINE

## 2025-05-08 PROCEDURE — 97535 SELF CARE MNGMENT TRAINING: CPT | Mod: GO

## 2025-05-08 PROCEDURE — 85025 COMPLETE CBC W/AUTO DIFF WBC: CPT

## 2025-05-08 PROCEDURE — 80048 BASIC METABOLIC PNL TOTAL CA: CPT | Performed by: INTERNAL MEDICINE

## 2025-05-08 PROCEDURE — 36415 COLL VENOUS BLD VENIPUNCTURE: CPT | Performed by: INTERNAL MEDICINE

## 2025-05-08 PROCEDURE — 1210000001 HC SEMI-PRIVATE ROOM DAILY

## 2025-05-08 PROCEDURE — 2500000002 HC RX 250 W HCPCS SELF ADMINISTERED DRUGS (ALT 637 FOR MEDICARE OP, ALT 636 FOR OP/ED): Performed by: INTERNAL MEDICINE

## 2025-05-08 PROCEDURE — 2500000001 HC RX 250 WO HCPCS SELF ADMINISTERED DRUGS (ALT 637 FOR MEDICARE OP)

## 2025-05-08 PROCEDURE — 99232 SBSQ HOSP IP/OBS MODERATE 35: CPT

## 2025-05-08 PROCEDURE — 2500000004 HC RX 250 GENERAL PHARMACY W/ HCPCS (ALT 636 FOR OP/ED): Performed by: INTERNAL MEDICINE

## 2025-05-08 PROCEDURE — 97530 THERAPEUTIC ACTIVITIES: CPT | Mod: GP,CQ

## 2025-05-08 PROCEDURE — 97530 THERAPEUTIC ACTIVITIES: CPT | Mod: GO

## 2025-05-08 RX ORDER — POTASSIUM CHLORIDE 750 MG/1
10 TABLET, FILM COATED, EXTENDED RELEASE ORAL DAILY
Status: DISCONTINUED | OUTPATIENT
Start: 2025-05-08 | End: 2025-05-10 | Stop reason: HOSPADM

## 2025-05-08 RX ORDER — FUROSEMIDE 20 MG/1
20 TABLET ORAL DAILY
Status: DISCONTINUED | OUTPATIENT
Start: 2025-05-08 | End: 2025-05-10 | Stop reason: HOSPADM

## 2025-05-08 RX ORDER — CELECOXIB 200 MG/1
200 CAPSULE ORAL DAILY
Status: DISCONTINUED | OUTPATIENT
Start: 2025-05-09 | End: 2025-05-10 | Stop reason: HOSPADM

## 2025-05-08 RX ADMIN — HEPARIN SODIUM 5000 UNITS: 5000 INJECTION INTRAVENOUS; SUBCUTANEOUS at 21:59

## 2025-05-08 RX ADMIN — FUROSEMIDE 20 MG: 20 TABLET ORAL at 09:04

## 2025-05-08 RX ADMIN — POTASSIUM CHLORIDE 10 MEQ: 750 TABLET, EXTENDED RELEASE ORAL at 09:04

## 2025-05-08 RX ADMIN — METOPROLOL SUCCINATE 25 MG: 25 TABLET, EXTENDED RELEASE ORAL at 09:04

## 2025-05-08 RX ADMIN — AMLODIPINE BESYLATE 5 MG: 5 TABLET ORAL at 09:03

## 2025-05-08 RX ADMIN — ACETAMINOPHEN 650 MG: 325 TABLET ORAL at 09:04

## 2025-05-08 RX ADMIN — ACETAMINOPHEN 650 MG: 325 TABLET ORAL at 16:35

## 2025-05-08 RX ADMIN — ACETAMINOPHEN 650 MG: 325 TABLET ORAL at 21:59

## 2025-05-08 RX ADMIN — PANTOPRAZOLE SODIUM 40 MG: 40 TABLET, DELAYED RELEASE ORAL at 05:44

## 2025-05-08 RX ADMIN — HEPARIN SODIUM 5000 UNITS: 5000 INJECTION INTRAVENOUS; SUBCUTANEOUS at 09:04

## 2025-05-08 ASSESSMENT — COGNITIVE AND FUNCTIONAL STATUS - GENERAL
PERSONAL GROOMING: A LITTLE
TOILETING: A LITTLE
CLIMB 3 TO 5 STEPS WITH RAILING: TOTAL
TURNING FROM BACK TO SIDE WHILE IN FLAT BAD: A LITTLE
MOBILITY SCORE: 16
DRESSING REGULAR UPPER BODY CLOTHING: A LITTLE
HELP NEEDED FOR BATHING: A LITTLE
MOBILITY SCORE: 22
STANDING UP FROM CHAIR USING ARMS: A LITTLE
MOVING FROM LYING ON BACK TO SITTING ON SIDE OF FLAT BED WITH BEDRAILS: A LITTLE
DAILY ACTIVITIY SCORE: 20
MOVING TO AND FROM BED TO CHAIR: A LITTLE
WALKING IN HOSPITAL ROOM: A LITTLE
PERSONAL GROOMING: A LITTLE
CLIMB 3 TO 5 STEPS WITH RAILING: A LITTLE
WALKING IN HOSPITAL ROOM: A LITTLE
TOILETING: A LOT
DAILY ACTIVITIY SCORE: 15
EATING MEALS: A LITTLE
DRESSING REGULAR LOWER BODY CLOTHING: A LITTLE
DRESSING REGULAR LOWER BODY CLOTHING: A LOT
HELP NEEDED FOR BATHING: A LOT

## 2025-05-08 ASSESSMENT — PAIN SCALES - GENERAL
PAINLEVEL_OUTOF10: 0 - NO PAIN

## 2025-05-08 ASSESSMENT — PAIN SCALES - WONG BAKER: WONGBAKER_NUMERICALRESPONSE: NO HURT

## 2025-05-08 ASSESSMENT — PAIN - FUNCTIONAL ASSESSMENT
PAIN_FUNCTIONAL_ASSESSMENT: 0-10
PAIN_FUNCTIONAL_ASSESSMENT: 0-10

## 2025-05-08 ASSESSMENT — ACTIVITIES OF DAILY LIVING (ADL): HOME_MANAGEMENT_TIME_ENTRY: 10

## 2025-05-08 NOTE — ASSESSMENT & PLAN NOTE
Generalized weakness  Bilateral sacroiliitis L>R  Gait instability  Hx osteoarthritis  Attending has started Celebrex  Fall precautions. PT OT    Atrial fibrillation  Continue BB w/hold parameters  Telemetry  Not candidate for anticoagulation due to advanced age/risk profile  S/P cardiology consult, signed off    HTN  Continue BB and amlodipine  Attending has added Lasix yesterday  Monitor BP    Dementia  Monitor for behaviors  Reorient frequently    DVT/GI  Heparin subcu/PPI    Disposition: SNF

## 2025-05-08 NOTE — PROGRESS NOTES
Nirmala Patrick is a 89 y.o. 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      Subjective   Sitting up in bed.  Denies pain at this time.  Expresses frustration about 1 episode of fecal incontinence today. Remains pleasantly confused.       Objective     Last Recorded Vitals  /79 (BP Location: Right arm, Patient Position: Sitting)   Pulse 99   Temp 36.5 °C (97.7 °F) (Oral)   Resp 18   Wt 83.9 kg (185 lb)   SpO2 98%   Intake/Output last 3 Shifts:    Intake/Output Summary (Last 24 hours) at 5/8/2025 1251  Last data filed at 5/8/2025 1023  Gross per 24 hour   Intake 170 ml   Output --   Net 170 ml       Admission Weight  Weight: 83.9 kg (185 lb) (05/05/25 1154)    Daily Weight  05/05/25 : 83.9 kg (185 lb)    Image Results  ECG 12 lead  Atrial fibrillation  Rightward axis  Low voltage QRS  Septal infarct , age undetermined  Abnormal ECG    Confirmed by Duane Mayo ([id number]) on 5/6/2025 10:57:43 AM      Physical Exam  Vitals and nursing note reviewed.   Constitutional:       Appearance: Normal appearance.   HENT:      Head: Normocephalic and atraumatic.      Right Ear: External ear normal.      Left Ear: External ear normal.      Ears:      Comments: Very hard of hearing     Nose: Nose normal.      Mouth/Throat:      Mouth: Mucous membranes are moist.   Eyes:      Extraocular Movements: Extraocular movements intact.      Conjunctiva/sclera: Conjunctivae normal.   Cardiovascular:      Rate and Rhythm: Normal rate.      Pulses: Normal pulses.      Heart sounds: Normal heart sounds.   Pulmonary:      Effort: Pulmonary effort is normal.      Breath sounds: Normal breath sounds.   Abdominal:      General: Bowel sounds are normal.      Palpations: Abdomen is soft.   Musculoskeletal:         General: Normal range of motion.      Cervical back: Normal range of motion and neck supple.   Skin:     General: Skin is warm and dry.      Capillary Refill: Capillary refill takes  less than 2 seconds.   Neurological:      Mental Status: She is alert.      Comments: Alert and oriented x 1 sometimes 2  Suspect at baseline   Psychiatric:         Mood and Affect: Mood normal.      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 PT OT    Atrial fibrillation  Continue BB w/hold parameters  Telemetry  Not candidate for anticoagulation due to advanced age/risk profile  S/P cardiology consult, signed off    HTN  Continue BB and amlodipine  Attending has added Lasix yesterday  Monitor BP    Dementia  Monitor for behaviors  Reorient frequently    DVT/GI  Heparin subcu/PPI    Disposition: SNF              Aury Swift, APRN-CNP

## 2025-05-08 NOTE — CARE PLAN
The patient's goals for the shift include MAINTAIN SAFETY,PAIN CTRL    The clinical goals for the shift include remain free from injury today    Ov

## 2025-05-08 NOTE — PROGRESS NOTES
Occupational Therapy    Occupational Therapy Treatment    Name: Nirmala Patrick  MRN: [id number]  Department: [address]  Room: [facility]  Date: 05/08/25  Time Calculation  Start Time: 1113  Stop Time: 1144  Time Calculation (min): 31 min    Assessment:  OT Assessment: Patient will continue to benefit from skilled OT to maximize safety and independence with daily tasks.  Prognosis: Fair  Barriers to Discharge Home: Caregiver assistance, Cognition needs, Physical needs  Caregiver Assistance: Caregiver assistance needed per identified barriers - however, level of patient's required assistance exceeds assistance available at home  Cognition Needs: 24hr supervision for safety awareness needed, Cognition-related high falls risk  Physical Needs: Stair navigation into home limited by function/safety, Stair navigation to access bed limited by function/safety, Stair navigation to access bath limited by function/safety, 24hr mobility assistance needed, 24hr ADL assistance needed, High falls risk due to function or environment  Evaluation/Treatment Tolerance: Patient tolerated treatment well  End of Session Communication: Bedside nurse  End of Session Patient Position: Bed, 3 rail up, Alarm on  Plan:  Treatment Interventions: ADL retraining, Functional transfer training, UE strengthening/ROM, Endurance training, Cognitive reorientation, Patient/family training, Compensatory technique education  OT Frequency: 3 times per week  OT Discharge Recommendations: Moderate intensity level of continued care  Equipment Recommended upon Discharge: Wheeled walker  OT Recommended Transfer Status: Assistive equipment (Comment), Assist of 1  OT - OK to Discharge: Yes    Subjective   Previous Visit Info:  OT Last Visit  OT Received On: 05/08/25  General:  General  Reason for Referral: impaired activities of daily living d/t dementia  Referred By: Dr. Sung MD  Past Medical History Relevant to Rehab: diverticulosis; ventral hernia; sacriolitis; OA  "; HTN; A-fib  Family/Caregiver Present: Yes  Caregiver Feedback: friend \"Darron\" present at end of session  Co-Treatment: PT  Co-Treatment Reason: partial co-tx to optimize pt outcomes/safety/participation  Prior to Session Communication: Bedside nurse  Patient Position Received: Bed, 3 rail up, Alarm on  Preferred Learning Style: verbal, visual  General Comment: Patient is cleared by nursing for therapy. Patient in bed upon arrival and agreeable to participate with verbal encouragement and education  Precautions:  Hearing/Visual Limitations: wears glasses; severe [place]; wears B hearing aides  Medical Precautions: Fall precautions    Pain Assessment:  Pain Assessment  Pain Assessment: 0-10  0-10 (Numeric) Pain Score: 0 - No pain    Objective   Cognition:  Overall Cognitive Status: Impaired at baseline  Cognition Comments: pleasant. difficult to redirect. requires increased cues and time for inconsistent command following. easily distracted and off topic  Safety/Judgement:  (decreased)  Insight: Severe  Impulsive: Moderately  Activities of Daily Living: Grooming  Grooming Level of Assistance: Setup, Close supervision  Grooming Where Assessed: Edge of bed  Grooming Comments: attempted to have patient brush her teeth while at EOB, despite mutliple attempts, patient not following commands to complete. patient is able to wash her face at EOB    Bed Mobility/Transfers: Bed Mobility  Bed Mobility: Yes  Bed Mobility 1  Bed Mobility 1: Supine to sitting  Level of Assistance 1: Contact guard  Bed Mobility Comments 1: slow, effortful, increased time. head of bed elevated, use of bed rails  Bed Mobility 2  Bed Mobility  2: Sitting to supine  Level of Assistance 2: Contact guard  Bed Mobility Comments 2: slow, effortful, increased time    Transfers  Transfer: Yes  Transfer 1  Transfer From 1: Bed to  Transfer to 1: Stand  Technique 1: Sit to stand  Transfer Device 1: Walker  Transfer Level of Assistance 1: Minimum " assistance  Trials/Comments 1: patient pulls up from the walker, does not follow commands to push from the bed    Functional Mobility:  Functional Mobility  Functional Mobility Performed: Yes  Functional Mobility 1  Surface 1: Level tile  Device 1: Rolling walker  Assistance 1: Contact guard  Comments 1: patient completes functional mobility > household distances. easily distracted throughout. very slow to complete  Sitting Balance:  Static Sitting Balance  Static Sitting-Balance Support: Feet supported  Static Sitting-Level of Assistance: Close supervision  Dynamic Sitting Balance  Dynamic Sitting-Balance Support: Feet supported  Dynamic Sitting-Level of Assistance: Close supervision  Dynamic Sitting-Balance: Reaching for objects, Reaching across midline  Dynamic Sitting-Comments: ~15 minutes  Standing Balance:  Static Standing Balance  Static Standing-Balance Support: Bilateral upper extremity supported  Static Standing-Level of Assistance: Contact guard    Therapy/Activity: Therapeutic Activity  Therapeutic Activity Performed: Yes  Therapeutic Activity 1: patient sits EOB ~15 minutes completing various activities for short periods of time for each, as patient is easily distracted. Close Supervision for safety. CGA for functional mobility. Increased time to complete all tasks    RUE   RUE : Within Functional Limits and LUE   LUE: Within Functional Limits    Outcome Measures:  [facility] Daily Activity  Putting on and taking off regular lower body clothing: A lot  Bathing (including washing, rinsing, drying): A lot  Putting on and taking off regular upper body clothing: A little  Toileting, which includes using toilet, bedpan or urinal: A lot  Taking care of personal grooming such as brushing teeth: A little  Eating Meals: A little  Daily Activity - Total Score: 15    Education Documentation  Precautions, taught by Romelia Farias OT at 5/8/2025 11:54 AM.  Learner: Patient  Readiness: Acceptance  Method: Demonstration,  Explanation  Response: Needs Reinforcement  Comment: Education on OT POC. education on safe functional transfers and mobility    Body Mechanics, taught by Romelia Farias OT at 5/8/2025 11:54 AM.  Learner: Patient  Readiness: Acceptance  Method: Demonstration, Explanation  Response: Needs Reinforcement  Comment: Education on OT POC. education on safe functional transfers and mobility    Education Comments  No comments found.      Goals:  Encounter Problems       Encounter Problems (Active)       ADLs       Patient with complete upper body dressing with supervision level of assistance donning and doffing all UE clothes  while edge of bed  (Progressing)       Start:  05/06/25    Expected End:  06/06/25            Patient with complete lower body dressing with supervision level of assistance donning and doffing all LE clothes  with PRN adaptive equipment while edge of bed  (Progressing)       Start:  05/06/25    Expected End:  06/06/25            Patient will complete daily grooming tasks with supervision level of assistance and PRN adaptive equipment while standing. (Progressing)       Start:  05/06/25    Expected End:  06/06/25            Patient will complete toileting including hygiene clothing management/hygiene with supervision level of assistance and grab bars. (Progressing)       Start:  05/06/25    Expected End:  06/06/25               MOBILITY       Patient will perform Functional mobility max Household distances/Community Distances with supervision level of assistance and least restrictive device in order to improve safety and functional mobility. (Progressing)       Start:  05/06/25    Expected End:  06/06/25

## 2025-05-08 NOTE — PROGRESS NOTES
05/08/25 1307   Discharge Planning   Expected Discharge Disposition SNF     [facility] Spoke to SUSHANT Rg receive multiple choices for SNF, no FOC   Referrals sent at this time     Precert needed prior to discharge   ** do not discharge without speaking to care coordination**

## 2025-05-08 NOTE — PROGRESS NOTES
"Physical Therapy    Physical Therapy Treatment    Patient Name: Nirmala Patrick  MRN: [id number]  Department: [address]  Room: [address] [place] [address]  Today's Date: 5/8/2025  Time Calculation  Start Time: 1121  Stop Time: 1144  Time Calculation (min): 23 min         Assessment/Plan   PT Assessment  Barriers to Discharge Home: Caregiver assistance, Cognition needs, Physical needs  Caregiver Assistance: Caregiver assistance needed per identified barriers - however, level of patient's required assistance exceeds assistance available at home  End of Session Communication: Bedside nurse  Assessment Comment: Pt continues to present with mild strength, balance and endurance deficits. Pt would continued to benefit from skilled therapy services.  End of Session Patient Position: Bed, 3 rail up, Alarm on  PT Plan  Inpatient/Swing Bed or Outpatient: Inpatient  PT Plan  Treatment/Interventions: Bed mobility, Transfer training, Gait training, Balance training, Neuromuscular re-education, Strengthening, Endurance training, Therapeutic exercise, Therapeutic activity  PT Plan: Ongoing PT  PT Frequency: 4 times per week  PT Discharge Recommendations: Moderate intensity level of continued care, No PT needed after discharge  Equipment Recommended upon Discharge: Wheeled walker  PT Recommended Transfer Status: Assist x1, Assistive device  PT - OK to Discharge: Yes      General Visit Information:   PT  Visit  PT Received On: 05/08/25  General  Family/Caregiver Present: Yes  Caregiver Feedback: friend \"Darron\" present at end of session  Co-Treatment: OT  Co-Treatment Reason: partial co-tx to optimize pt outcomes/safety/participation  Prior to Session Communication: Bedside nurse  General Comment: Cleared by nursing to be seen for therapy, pt agreeable with tx, seated EOB upon arrival.    Subjective   Precautions:  Precautions  Medical Precautions: Fall precautions    Objective   Pain:  Pain Assessment  Pain Assessment: 0-10  0-10 (Numeric) Pain Score: 0 " - No pain    Cognition:  Cognition  Overall Cognitive Status: Impaired at baseline  Cognition Comments: Pleasant. difficult to redirect. requires increased cues and time for inconsistent command following. easily distracted and off topic  Insight: Severe     Postural Control:  Static Sitting Balance  Static Sitting-Balance Support: Feet supported, Bilateral upper extremity supported  Static Sitting-Level of Assistance: Contact guard  Static Sitting-Comment/Number of Minutes: Fair+ seated static balance.  Static Standing Balance  Static Standing-Balance Support: Bilateral upper extremity supported  Static Standing-Level of Assistance: Minimum assistance  Static Standing-Comment/Number of Minutes: Fair static standing balance with bilateral UE support on walker.     Treatments:  Therapeutic Exercise  Therapeutic Exercise Performed: No (Attempted therex, pt unable to remain on task to complete therex despite encouragement from therapist and visitor.)    Therapeutic Activity  Therapeutic Activity Performed: Yes  Therapeutic Activity 1: Tolerated EOB ~15 minutes with occasional posterior lean requiring cues for midline correction.    Bed Mobility  Bed Mobility: Yes  Bed Mobility 1  Bed Mobility 1: Sitting to supine  Level of Assistance 1: Contact guard  Bed Mobility Comments 1: Increased time and effort to complete bed mobility with HOB slightly elevated.    Ambulation/Gait Training  Ambulation/Gait Training Performed: Yes  Ambulation/Gait Training 1  Surface 1: Level tile  Device 1: Rolling walker  Assistance 1: Contact guard  Quality of Gait 1: Narrow base of support, Decreased step length, Diminished heel strike, Forward flexed posture  Comments/Distance (ft) 1: 50' with wheeled walker, requires cues to remain in FARZANA of walker, pt placing UE's too far forward onto walker attempted to correct without success. CGA for balance and safety    Transfers  Transfer: Yes  Transfer 1  Transfer From 1: Sit to  Transfer to 1:  Stand  Technique 1: Sit to stand, Stand to sit  Transfer Device 1: Walker  Transfer Level of Assistance 1: Minimum assistance  Trials/Comments 1: Min assist for trunk up during sit to stand, pt pulling up from walker to stand, cues for safety during eccentric control in sitting.    Outcome Measures:  [facility] Basic Mobility  Turning from your back to your side while in a flat bed without using bedrails: A little  Moving from lying on your back to sitting on the side of a flat bed without using bedrails: A little  Moving to and from bed to chair (including a wheelchair): A little  Standing up from a chair using your arms (e.g. wheelchair or bedside chair): A little  To walk in hospital room: A little  Climbing 3-5 steps with railing: Total  Basic Mobility - Total Score: 16    Education Documentation  Precautions, taught by Stephanie Renner PTA at 5/8/2025 12:51 PM.  Learner: Patient  Readiness: Acceptance  Method: Explanation  Response: No Evidence of Learning    Mobility Training, taught by Stephanie Renner PTA at 5/8/2025 12:51 PM.  Learner: Patient  Readiness: Acceptance  Method: Explanation  Response: No Evidence of Learning    Education Comments  05/08/25 1251 Stephanie Renner PTA  Patient educated on the importance of mobility and participation with therapy. Educated on safety and use of call light for assistance.           Encounter Problems       Encounter Problems (Active)       Mobility       STG - Patient will ambulate 30' x 1 using rolling walker with SBA (Progressing)       Start:  05/06/25    Expected End:  05/20/25            STG - Patient will negotiate 12 stairs using 1 railing with cane and CGA (Progressing)       Start:  05/06/25    Expected End:  05/20/25               PT Transfers       STG - Patient to transfer to and from sit to supine with CGA (Progressing)       Start:  05/06/25    Expected End:  05/20/25            STG - Patient will transfer sit to and from stand with SBA (Progressing)       Start:   05/06/25    Expected End:  05/20/25               Pain - Adult

## 2025-05-09 LAB
ANION GAP SERPL CALCULATED.3IONS-SCNC: 13 MMOL/L (ref 10–20)
BASOPHILS # BLD AUTO: 0.03 X10*3/UL (ref 0–0.1)
BASOPHILS NFR BLD AUTO: 0.4 %
BUN SERPL-MCNC: 29 MG/DL (ref 6–23)
CALCIUM SERPL-MCNC: 9.5 MG/DL (ref 8.6–10.3)
CHLORIDE SERPL-SCNC: 102 MMOL/L (ref 98–107)
CO2 SERPL-SCNC: 25 MMOL/L (ref 21–32)
CREAT SERPL-MCNC: 0.85 MG/DL (ref 0.5–1.05)
EGFRCR SERPLBLD CKD-EPI 2021: 64 ML/MIN/1.73M*2
EOSINOPHIL # BLD AUTO: 0.58 X10*3/UL (ref 0–0.4)
EOSINOPHIL NFR BLD AUTO: 7.6 %
ERYTHROCYTE [DISTWIDTH] IN BLOOD BY AUTOMATED COUNT: 13.5 % (ref 11.5–14.5)
GLUCOSE SERPL-MCNC: 87 MG/DL (ref 74–99)
HCT VFR BLD AUTO: 42.8 % (ref 36–46)
HGB BLD-MCNC: 14 G/DL (ref 12–16)
IMM GRANULOCYTES # BLD AUTO: 0.03 X10*3/UL (ref 0–0.5)
IMM GRANULOCYTES NFR BLD AUTO: 0.4 % (ref 0–0.9)
LYMPHOCYTES # BLD AUTO: 2.54 X10*3/UL (ref 0.8–3)
LYMPHOCYTES NFR BLD AUTO: 33.1 %
MCH RBC QN AUTO: 29.9 PG (ref 26–34)
MCHC RBC AUTO-ENTMCNC: 32.7 G/DL (ref 32–36)
MCV RBC AUTO: 91 FL (ref 80–100)
MONOCYTES # BLD AUTO: 0.71 X10*3/UL (ref 0.05–0.8)
MONOCYTES NFR BLD AUTO: 9.3 %
NEUTROPHILS # BLD AUTO: 3.78 X10*3/UL (ref 1.6–5.5)
NEUTROPHILS NFR BLD AUTO: 49.2 %
NRBC BLD-RTO: 0 /100 WBCS (ref 0–0)
PLATELET # BLD AUTO: 264 X10*3/UL (ref 150–450)
POTASSIUM SERPL-SCNC: 3.9 MMOL/L (ref 3.5–5.3)
RBC # BLD AUTO: 4.69 X10*6/UL (ref 4–5.2)
SODIUM SERPL-SCNC: 136 MMOL/L (ref 136–145)
WBC # BLD AUTO: 7.7 X10*3/UL (ref 4.4–11.3)

## 2025-05-09 PROCEDURE — 2500000001 HC RX 250 WO HCPCS SELF ADMINISTERED DRUGS (ALT 637 FOR MEDICARE OP): Performed by: INTERNAL MEDICINE

## 2025-05-09 PROCEDURE — 97530 THERAPEUTIC ACTIVITIES: CPT | Mod: GP,CQ

## 2025-05-09 PROCEDURE — 99232 SBSQ HOSP IP/OBS MODERATE 35: CPT

## 2025-05-09 PROCEDURE — 2500000002 HC RX 250 W HCPCS SELF ADMINISTERED DRUGS (ALT 637 FOR MEDICARE OP, ALT 636 FOR OP/ED): Performed by: INTERNAL MEDICINE

## 2025-05-09 PROCEDURE — 80048 BASIC METABOLIC PNL TOTAL CA: CPT

## 2025-05-09 PROCEDURE — 2500000004 HC RX 250 GENERAL PHARMACY W/ HCPCS (ALT 636 FOR OP/ED): Performed by: INTERNAL MEDICINE

## 2025-05-09 PROCEDURE — 36415 COLL VENOUS BLD VENIPUNCTURE: CPT

## 2025-05-09 PROCEDURE — 2500000001 HC RX 250 WO HCPCS SELF ADMINISTERED DRUGS (ALT 637 FOR MEDICARE OP)

## 2025-05-09 PROCEDURE — 1210000001 HC SEMI-PRIVATE ROOM DAILY

## 2025-05-09 PROCEDURE — 97530 THERAPEUTIC ACTIVITIES: CPT | Mod: GO

## 2025-05-09 PROCEDURE — 85025 COMPLETE CBC W/AUTO DIFF WBC: CPT

## 2025-05-09 PROCEDURE — 97116 GAIT TRAINING THERAPY: CPT | Mod: GP,CQ

## 2025-05-09 RX ADMIN — HEPARIN SODIUM 5000 UNITS: 5000 INJECTION INTRAVENOUS; SUBCUTANEOUS at 20:31

## 2025-05-09 RX ADMIN — POTASSIUM CHLORIDE 10 MEQ: 750 TABLET, EXTENDED RELEASE ORAL at 08:49

## 2025-05-09 RX ADMIN — AMLODIPINE BESYLATE 5 MG: 5 TABLET ORAL at 08:49

## 2025-05-09 RX ADMIN — ACETAMINOPHEN 650 MG: 325 TABLET ORAL at 14:37

## 2025-05-09 RX ADMIN — FUROSEMIDE 20 MG: 20 TABLET ORAL at 08:49

## 2025-05-09 RX ADMIN — ACETAMINOPHEN 650 MG: 325 TABLET ORAL at 08:49

## 2025-05-09 RX ADMIN — METOPROLOL SUCCINATE 25 MG: 25 TABLET, EXTENDED RELEASE ORAL at 08:49

## 2025-05-09 RX ADMIN — HEPARIN SODIUM 5000 UNITS: 5000 INJECTION INTRAVENOUS; SUBCUTANEOUS at 08:49

## 2025-05-09 RX ADMIN — ACETAMINOPHEN 650 MG: 325 TABLET ORAL at 20:31

## 2025-05-09 RX ADMIN — PANTOPRAZOLE SODIUM 40 MG: 40 TABLET, DELAYED RELEASE ORAL at 06:33

## 2025-05-09 RX ADMIN — CELECOXIB 200 MG: 200 CAPSULE ORAL at 08:49

## 2025-05-09 ASSESSMENT — COGNITIVE AND FUNCTIONAL STATUS - GENERAL
WALKING IN HOSPITAL ROOM: A LITTLE
MOBILITY SCORE: 16
WALKING IN HOSPITAL ROOM: A LITTLE
PERSONAL GROOMING: A LITTLE
CLIMB 3 TO 5 STEPS WITH RAILING: A LITTLE
MOVING TO AND FROM BED TO CHAIR: A LITTLE
DRESSING REGULAR LOWER BODY CLOTHING: A LOT
PERSONAL GROOMING: A LITTLE
CLIMB 3 TO 5 STEPS WITH RAILING: A LITTLE
MOBILITY SCORE: 22
DRESSING REGULAR LOWER BODY CLOTHING: A LITTLE
MOBILITY SCORE: 22
HELP NEEDED FOR BATHING: A LITTLE
TOILETING: A LOT
EATING MEALS: A LITTLE
PERSONAL GROOMING: A LITTLE
HELP NEEDED FOR BATHING: A LOT
DAILY ACTIVITIY SCORE: 20
TURNING FROM BACK TO SIDE WHILE IN FLAT BAD: A LITTLE
TOILETING: A LITTLE
DRESSING REGULAR LOWER BODY CLOTHING: A LITTLE
WALKING IN HOSPITAL ROOM: A LITTLE
DRESSING REGULAR UPPER BODY CLOTHING: A LITTLE
STANDING UP FROM CHAIR USING ARMS: A LITTLE
MOVING FROM LYING ON BACK TO SITTING ON SIDE OF FLAT BED WITH BEDRAILS: A LITTLE
TOILETING: A LITTLE
DAILY ACTIVITIY SCORE: 15
DAILY ACTIVITIY SCORE: 20
HELP NEEDED FOR BATHING: A LITTLE
CLIMB 3 TO 5 STEPS WITH RAILING: TOTAL

## 2025-05-09 ASSESSMENT — PAIN SCALES - GENERAL
PAINLEVEL_OUTOF10: 0 - NO PAIN

## 2025-05-09 ASSESSMENT — PAIN - FUNCTIONAL ASSESSMENT
PAIN_FUNCTIONAL_ASSESSMENT: 0-10

## 2025-05-09 NOTE — PROGRESS NOTES
Physical Therapy    Physical Therapy Treatment    Patient Name: Nirmala Patrick  MRN: [id number]  Department: [address]  Room: [address] [place] [address]  Today's Date: 5/9/2025  Time Calculation  Start Time: 0817  Stop Time: 0840  Time Calculation (min): 23 min         Assessment/Plan   PT Assessment  Barriers to Discharge Home: Caregiver assistance, Cognition needs, Physical needs  Caregiver Assistance: Caregiver assistance needed per identified barriers - however, level of patient's required assistance exceeds assistance available at home  End of Session Communication: Bedside nurse  Assessment Comment: Pt continues to present with mild strength, balance and endurance deficits. Pt would continued to benefit from skilled therapy services.  End of Session Patient Position: Up in chair, Alarm on  PT Plan  Inpatient/Swing Bed or Outpatient: Inpatient  PT Plan  Treatment/Interventions: Bed mobility, Transfer training, Gait training, Balance training, Neuromuscular re-education, Strengthening, Endurance training, Therapeutic exercise, Therapeutic activity  PT Plan: Ongoing PT  PT Frequency: 4 times per week  PT Discharge Recommendations: Moderate intensity level of continued care, No PT needed after discharge  Equipment Recommended upon Discharge: Wheeled walker  PT Recommended Transfer Status: Assist x1, Assistive device  PT - OK to Discharge: Yes      General Visit Information:   PT  Visit  PT Received On: 05/09/25  General  Prior to Session Communication: Bedside nurse  Patient Position Received: Bed, 4 rail up, Alarm on  General Comment: Cleared by nursing to be seen for therapy, pt agreeable with tx, supine in bed upon arrival.    Subjective   Precautions:  Precautions  Medical Precautions: Fall precautions    Objective   Pain:  Pain Assessment  Pain Assessment: 0-10  0-10 (Numeric) Pain Score: 0 - No pain    Cognition:  Cognition  Overall Cognitive Status: Impaired at baseline  Cognition Comments: Pleasant. difficult to redirect.  requires increased cues and time for inconsistent command following. easily distracted and off topic  Insight: Severe  Impulsive: Moderately     Postural Control:  Static Sitting Balance  Static Sitting-Balance Support: Feet supported, Bilateral upper extremity supported  Static Sitting-Level of Assistance: Close supervision  Static Sitting-Comment/Number of Minutes: Fair + seated static balance  Static Standing Balance  Static Standing-Balance Support: Bilateral upper extremity supported  Static Standing-Level of Assistance: Minimum assistance  Static Standing-Comment/Number of Minutes: Fair static standing balance with bilateral UE support on walker.     Treatments:  Therapeutic Activity  Therapeutic Activity Performed: Yes  Therapeutic Activity 1: Tolerated seated EOB x5 minutes  Therapeutic Activity 2: During standing pt incontinent of urine requiring cleanse and sock change.    Bed Mobility  Bed Mobility: Yes  Bed Mobility 1  Bed Mobility 1: Supine to sitting  Level of Assistance 1: Contact guard  Bed Mobility Comments 1: Increased time and effort to complete bed mobility.    Ambulation/Gait Training  Ambulation/Gait Training Performed: Yes  Ambulation/Gait Training 1  Surface 1: Level tile  Device 1: Rolling walker  Assistance 1: Contact guard  Quality of Gait 1: Narrow base of support, Decreased step length, Diminished heel strike, Forward flexed posture  Comments/Distance (ft) 1: 10' with wheeled walker, cues to remain in FARZANA of walker, cues for proper hand placement on walker, flexed posture, CGA for balance and safety.    Transfers  Transfer: Yes  Transfer 1  Transfer From 1: Sit to  Transfer to 1: Stand  Technique 1: Sit to stand, Stand to sit  Transfer Device 1: Walker  Transfer Level of Assistance 1: Minimum assistance  Trials/Comments 1: Min assist for trunk up during sit to stand, pt pulling up from walker to stand, decreased eccentric control in sitting.    Outcome Measures:  [facility] Basic  Mobility  Turning from your back to your side while in a flat bed without using bedrails: A little  Moving from lying on your back to sitting on the side of a flat bed without using bedrails: A little  Moving to and from bed to chair (including a wheelchair): A little  Standing up from a chair using your arms (e.g. wheelchair or bedside chair): A little  To walk in hospital room: A little  Climbing 3-5 steps with railing: Total  Basic Mobility - Total Score: 16    Education Documentation  Precautions, taught by Stephanie Renner PTA at 5/9/2025 10:01 AM.  Learner: Patient  Readiness: Acceptance  Method: Explanation  Response: Needs Reinforcement    Mobility Training, taught by Stephanie Renner PTA at 5/9/2025 10:01 AM.  Learner: Patient  Readiness: Acceptance  Method: Explanation  Response: Needs Reinforcement    Education Comments  05/09/25 1001 Stephanie Renner PTA  Patient educated on the importance of mobility and participation with therapy. Educated on safety and use of call light for assistance.           Encounter Problems       Encounter Problems (Active)       Mobility       STG - Patient will ambulate 30' x 1 using rolling walker with SBA (Progressing)       Start:  05/06/25    Expected End:  05/20/25            STG - Patient will negotiate 12 stairs using 1 railing with cane and CGA (Not Progressing)       Start:  05/06/25    Expected End:  05/20/25               PT Transfers       STG - Patient to transfer to and from sit to supine with CGA (Progressing)       Start:  05/06/25    Expected End:  05/20/25            STG - Patient will transfer sit to and from stand with SBA (Progressing)       Start:  05/06/25    Expected End:  05/20/25               Pain - Adult

## 2025-05-09 NOTE — PROGRESS NOTES
Nirmala Patrick is a 89 y.o. 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      Subjective   Sitting up in chair.  Denies pain or shortness of breath at this time.  States she is looking forward to being discharged.       Objective     Last Recorded Vitals  /63 (BP Location: Left arm, Patient Position: Lying)   Pulse 59   Temp 37 °C (98.6 °F) (Axillary)   Resp 20   Wt 83.9 kg (185 lb)   SpO2 98%   Intake/Output last 3 Shifts:    Intake/Output Summary (Last 24 hours) at 5/9/2025 1405  Last data filed at 5/9/2025 0640  Gross per 24 hour   Intake --   Output 750 ml   Net -750 ml       Admission Weight  Weight: 83.9 kg (185 lb) (05/05/25 1154)    Daily Weight  05/05/25 : 83.9 kg (185 lb)    Image Results  ECG 12 lead  Atrial fibrillation  Rightward axis  Low voltage QRS  Septal infarct , age undetermined  Abnormal ECG    Confirmed by Duane Mayo ([id number]) on 5/6/2025 10:57:43 AM      Physical Exam  Vitals and nursing note reviewed.   Constitutional:       Appearance: Normal appearance.   HENT:      Head: Normocephalic and atraumatic.      Right Ear: External ear normal.      Left Ear: External ear normal.      Ears:      Comments: Very hard of hearing     Nose: Nose normal.      Mouth/Throat:      Mouth: Mucous membranes are moist.   Eyes:      Extraocular Movements: Extraocular movements intact.      Conjunctiva/sclera: Conjunctivae normal.   Cardiovascular:      Rate and Rhythm: Normal rate.      Pulses: Normal pulses.      Heart sounds: Normal heart sounds.   Pulmonary:      Effort: Pulmonary effort is normal.      Breath sounds: Normal breath sounds.   Abdominal:      General: Bowel sounds are normal.      Palpations: Abdomen is soft.   Musculoskeletal:         General: Normal range of motion.      Cervical back: Normal range of motion and neck supple.   Skin:     General: Skin is warm and dry.      Capillary Refill: Capillary refill takes less than 2 seconds.    Neurological:      Mental Status: She is alert.      Comments: Alert and oriented x 1 sometimes 2  Suspect at baseline   Psychiatric:         Mood and Affect: Mood normal.      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 PT OT    Atrial fibrillation  Continue BB w/hold parameters  Telemetry  Not candidate for anticoagulation due to advanced age/risk profile  S/P cardiology consult, signed off    HTN  Continue BB and amlodipine  Attending has added Lasix yesterday  Monitor BP    Dementia  Monitor for behaviors  Reorient frequently    DVT/GI  Heparin subcu/PPI    Disposition: SNF pending pre-CERT             Aury Swift, APRN-CNP

## 2025-05-09 NOTE — PROGRESS NOTES
05/09/25 1034   Discharge Planning   Expected Discharge Disposition SNF     TCC spoke to POA regarding accepting facilities, will speak with sister and choose [facility] and call [facility] back     Precert needed prior to discharge   ** do not discharge without speaking to care coordination**

## 2025-05-09 NOTE — PROGRESS NOTES
Occupational Therapy    OT Treatment    Patient Name: Nirmala Patrick  MRN: [id number]  Department: [address]  Room: [address] [place] [address]  Today's Date: 5/9/2025  Time Calculation  Start Time: 1345  Stop Time: 1400  Time Calculation (min): 15 min        Assessment:  OT Assessment: steady progress, MIN A for functional transfers, CGA when standing for functional tasks with use of FWW.  pt still limited by reduced endurance and decreased balance  Prognosis: Fair  Barriers to Discharge Home: Caregiver assistance, Cognition needs, Physical needs  Caregiver Assistance: Caregiver assistance needed per identified barriers - however, level of patient's required assistance exceeds assistance available at home  Cognition Needs: 24hr supervision for safety awareness needed, Cognition-related high falls risk  Physical Needs: Stair navigation into home limited by function/safety, Stair navigation to access bed limited by function/safety, Stair navigation to access bath limited by function/safety, 24hr mobility assistance needed, 24hr ADL assistance needed, High falls risk due to function or environment  Evaluation/Treatment Tolerance: Patient tolerated treatment well  Medical Staff Made Aware: Yes  End of Session Communication: Bedside nurse  End of Session Patient Position: Bed, 3 rail up, Alarm on  OT Assessment Results: Decreased ADL status, Decreased cognition, Decreased safe judgment during ADL, Decreased endurance, Decreased functional mobility, Decreased trunk control for functional activities  Prognosis: Fair  Barriers to Discharge: Decreased caregiver support  Evaluation/Treatment Tolerance: Patient tolerated treatment well  Medical Staff Made Aware: Yes  Strengths: Attitude of self  Barriers to Participation: Ability to acquire knowledge, Comorbidities  Plan:  Treatment Interventions: ADL retraining, Functional transfer training, UE strengthening/ROM, Endurance training, Equipment evaluation/education, Compensatory technique  education  OT Frequency: 3 times per week  OT Discharge Recommendations: Moderate intensity level of continued care  Equipment Recommended upon Discharge: Wheeled walker  OT Recommended Transfer Status: Assist of 1, Minimal assist  OT - OK to Discharge: Yes  Treatment Interventions: ADL retraining, Functional transfer training, UE strengthening/ROM, Endurance training, Equipment evaluation/education, Compensatory technique education    Subjective   Previous Visit Info:  OT Last Visit  OT Received On: 05/09/25  General:  General  Reason for Referral: 89 y/o female admitted with 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 pt found to have A-flutter;  Past Medical History Relevant to Rehab: diverticulosis; ventral hernia; sacriolitis; OA ; HTN; A-fib  Family/Caregiver Present: No  Prior to Session Communication: Bedside nurse  Patient Position Received: Bed, 3 rail up, Alarm on  Preferred Learning Style: verbal, visual  General Comment: pt with significant hard of hearing w/ difficulty following directions and staying on task  Precautions:  Medical Precautions: Fall precautions     Date/Time Vitals Session Patient Position Pulse Resp SpO2 BP MAP (mmHg)    05/09/25 1300 --  --  59  20  98 %  130/63  81                 Pain:  Pain Assessment  Pain Assessment: 0-10  0-10 (Numeric) Pain Score: 0 - No pain    Objective    Cognition:  Cognition  Overall Cognitive Status: Impaired at baseline  Orientation Level: Disoriented to situation, Disoriented to time, Disoriented to place  Following Commands: Follows one step commands with repetition  Cognition Comments: significant hard of hearing, frequent cues for redirection, inconsistent command following     Activities of Daily Living: Grooming  Grooming Level of Assistance: Close supervision  Grooming Where Assessed: Chair  Grooming Comments: pt performed oral hygiene while seated in chair, required setting up of task and cues to  initiate/sequencing due to poor sustained attention  Functional Standing Tolerance:  Time: 3 minutes  Activity: static standing with FWW while performing dynamic reaching task in forward planes.  pt performed x15 reps x2 sets each UE reaching out of FARZANA.  pt requiring CGA for safety and frequent cues for sequencing of task.  pt with limited Right shoulder ROM and raymond able to achiee ~90 degrees sh flexion.  pt progressed to unsupported standing x10 reps at CGA for safety  Bed Mobility/Transfers: Bed Mobility 1  Bed Mobility 1: Supine to sitting  Level of Assistance 1: Contact guard  Bed Mobility Comments 1: no physical assistance however effortful and extended time to complete  Bed Mobility 2  Bed Mobility  2: Sitting to supine  Level of Assistance 2: Contact guard  Bed Mobility Comments 2: effortful, heavy reliance on momentum to bring LEs back into bed    Transfer 1  Technique 1: Sit to stand, Stand to sit  Transfer Device 1: Walker  Transfer Level of Assistance 1: Minimum assistance  Trials/Comments 1: from edge of bed and from standard chair.  x3 stands performed throughout session requiring MIN A to ascend into standing position  Transfers 2  Transfer to 2: Chair with arms  Technique 2: Stand to sit  Transfer Device 2: Walker  Transfer Level of Assistance 2: Minimum assistance  Trials/Comments 2: pt transferred to standard chair at beginning of session to perform standing balance exercises. required cues for positioning and hand placement prior to descent  Transfers 3  Transfer to 3: Bed  Technique 3: Stand to sit  Transfer Device 3: Walker  Transfer Level of Assistance 3: Minimum assistance  Trials/Comments 3: pt transferred back to bed at end of session with use of FWW, cues for positioning and hand placement throughout    Sitting Balance:  Static Sitting Balance  Static Sitting-Balance Support: Feet supported  Static Sitting-Level of Assistance: Close supervision  Standing Balance:  Static Standing  Balance  Static Standing-Balance Support: Bilateral upper extremity supported  Static Standing-Level of Assistance: Contact guard    Outcome Measures:[facility] Daily Activity  Putting on and taking off regular lower body clothing: A lot  Bathing (including washing, rinsing, drying): A lot  Putting on and taking off regular upper body clothing: A little  Toileting, which includes using toilet, bedpan or urinal: A lot  Taking care of personal grooming such as brushing teeth: A little  Eating Meals: A little  Daily Activity - Total Score: 15        Education Documentation  Home Exercise Program, taught by Mitchell Marrufo OT at 5/9/2025  2:22 PM.  Learner: Patient  Readiness: Acceptance  Method: Explanation  Response: Verbalizes Understanding  Comment: ADL/functional mobility techniques, facilitating OOB activity, OT POC, purpose of tasks performed to increase independence    Body Mechanics, taught by Mitchell Marrufo OT at 5/9/2025  2:22 PM.  Learner: Patient  Readiness: Acceptance  Method: Explanation  Response: Verbalizes Understanding  Comment: ADL/functional mobility techniques, facilitating OOB activity, OT POC, purpose of tasks performed to increase independence    ADL Training, taught by Mitchell Marrufo OT at 5/9/2025  2:22 PM.  Learner: Patient  Readiness: Acceptance  Method: Explanation  Response: Verbalizes Understanding  Comment: ADL/functional mobility techniques, facilitating OOB activity, OT POC, purpose of tasks performed to increase independence    Education Comments  No comments found.        OP EDUCATION:       Goals:  Encounter Problems       Encounter Problems (Active)       ADLs       Patient with complete upper body dressing with supervision level of assistance donning and doffing all UE clothes  while edge of bed  (Progressing)       Start:  05/06/25    Expected End:  06/06/25            Patient with complete lower body dressing with supervision level of assistance donning and doffing all LE clothes   with PRN adaptive equipment while edge of bed  (Progressing)       Start:  05/06/25    Expected End:  06/06/25            Patient will complete daily grooming tasks with supervision level of assistance and PRN adaptive equipment while standing. (Progressing)       Start:  05/06/25    Expected End:  06/06/25            Patient will complete toileting including hygiene clothing management/hygiene with supervision level of assistance and grab bars. (Progressing)       Start:  05/06/25    Expected End:  06/06/25               MOBILITY       Patient will perform Functional mobility max Household distances/Community Distances with supervision level of assistance and least restrictive device in order to improve safety and functional mobility. (Progressing)       Start:  05/06/25    Expected End:  06/06/25

## 2025-05-09 NOTE — CARE PLAN
Problem: Fall/Injury  Goal: Not fall by end of shift  Outcome: Progressing  Goal: Be free from injury by end of the shift  Outcome: Progressing  Goal: Verbalize understanding of personal risk factors for fall in the hospital  Outcome: Progressing  Goal: Verbalize understanding of risk factor reduction measures to prevent injury from fall in the home  Outcome: Progressing  Goal: Use assistive devices by end of the shift  Outcome: Progressing  Goal: Pace activities to prevent fatigue by end of the shift  Outcome: Progressing     Problem: Pain - Adult  Goal: Verbalizes/displays adequate comfort level or baseline comfort level  Outcome: Progressing     Problem: Safety - Adult  Goal: Free from fall injury  Outcome: Progressing     Problem: Discharge Planning  Goal: Discharge to home or other facility with appropriate resources  Outcome: Progressing     Problem: Chronic Conditions and Co-morbidities  Goal: Patient's chronic conditions and co-morbidity symptoms are monitored and maintained or improved  Outcome: Progressing     Problem: Nutrition  Goal: Nutrient intake appropriate for maintaining nutritional needs  Outcome: Progressing     Problem: Skin  Goal: Prevent/minimize sheer/friction injuries  Outcome: Progressing  Goal: Promote/optimize nutrition  Outcome: Progressing  Goal: Promote skin healing  Outcome: Progressing   The patient's goals for the shift include MAINTAIN SAFETY,PAIN CTRL    The clinical goals for the shift include no falls    Over the shift, the patient did not make progress toward the following goals. Barriers to progression include co-morbidities. Recommendations to address these barriers include pt/ot/assist with ambulation.

## 2025-05-09 NOTE — ASSESSMENT & PLAN NOTE
Generalized weakness  Bilateral sacroiliitis L>R  Gait instability  Hx osteoarthritis  Attending has started Celebrex  Fall precautions. PT OT    Atrial fibrillation  Continue BB w/hold parameters  Telemetry  Not candidate for anticoagulation due to advanced age/risk profile  S/P cardiology consult, signed off    HTN  Continue BB and amlodipine  Attending has added Lasix yesterday  Monitor BP    Dementia  Monitor for behaviors  Reorient frequently    DVT/GI  Heparin subcu/PPI    Disposition: SNF pending pre-CERT

## 2025-05-10 VITALS
HEIGHT: 64 IN | TEMPERATURE: 97.7 F | HEART RATE: 91 BPM | SYSTOLIC BLOOD PRESSURE: 124 MMHG | BODY MASS INDEX: 31.58 KG/M2 | WEIGHT: 185 LBS | DIASTOLIC BLOOD PRESSURE: 65 MMHG | OXYGEN SATURATION: 100 % | RESPIRATION RATE: 18 BRPM

## 2025-05-10 LAB
ANION GAP SERPL CALCULATED.3IONS-SCNC: 12 MMOL/L (ref 10–20)
BASOPHILS # BLD AUTO: 0.02 X10*3/UL (ref 0–0.1)
BASOPHILS NFR BLD AUTO: 0.3 %
BUN SERPL-MCNC: 27 MG/DL (ref 6–23)
CALCIUM SERPL-MCNC: 9.6 MG/DL (ref 8.6–10.3)
CHLORIDE SERPL-SCNC: 104 MMOL/L (ref 98–107)
CO2 SERPL-SCNC: 26 MMOL/L (ref 21–32)
CREAT SERPL-MCNC: 0.83 MG/DL (ref 0.5–1.05)
EGFRCR SERPLBLD CKD-EPI 2021: 66 ML/MIN/1.73M*2
EOSINOPHIL # BLD AUTO: 0.53 X10*3/UL (ref 0–0.4)
EOSINOPHIL NFR BLD AUTO: 6.8 %
ERYTHROCYTE [DISTWIDTH] IN BLOOD BY AUTOMATED COUNT: 13.4 % (ref 11.5–14.5)
GLUCOSE SERPL-MCNC: 91 MG/DL (ref 74–99)
HCT VFR BLD AUTO: 43.1 % (ref 36–46)
HGB BLD-MCNC: 14.1 G/DL (ref 12–16)
IMM GRANULOCYTES # BLD AUTO: 0.02 X10*3/UL (ref 0–0.5)
IMM GRANULOCYTES NFR BLD AUTO: 0.3 % (ref 0–0.9)
LYMPHOCYTES # BLD AUTO: 3.16 X10*3/UL (ref 0.8–3)
LYMPHOCYTES NFR BLD AUTO: 40.4 %
MCH RBC QN AUTO: 30.1 PG (ref 26–34)
MCHC RBC AUTO-ENTMCNC: 32.7 G/DL (ref 32–36)
MCV RBC AUTO: 92 FL (ref 80–100)
MONOCYTES # BLD AUTO: 0.68 X10*3/UL (ref 0.05–0.8)
MONOCYTES NFR BLD AUTO: 8.7 %
NEUTROPHILS # BLD AUTO: 3.41 X10*3/UL (ref 1.6–5.5)
NEUTROPHILS NFR BLD AUTO: 43.5 %
NRBC BLD-RTO: 0 /100 WBCS (ref 0–0)
PLATELET # BLD AUTO: 271 X10*3/UL (ref 150–450)
POTASSIUM SERPL-SCNC: 4.1 MMOL/L (ref 3.5–5.3)
RBC # BLD AUTO: 4.68 X10*6/UL (ref 4–5.2)
SODIUM SERPL-SCNC: 138 MMOL/L (ref 136–145)
WBC # BLD AUTO: 7.8 X10*3/UL (ref 4.4–11.3)

## 2025-05-10 PROCEDURE — 80048 BASIC METABOLIC PNL TOTAL CA: CPT

## 2025-05-10 PROCEDURE — 2500000002 HC RX 250 W HCPCS SELF ADMINISTERED DRUGS (ALT 637 FOR MEDICARE OP, ALT 636 FOR OP/ED): Performed by: INTERNAL MEDICINE

## 2025-05-10 PROCEDURE — 2500000004 HC RX 250 GENERAL PHARMACY W/ HCPCS (ALT 636 FOR OP/ED): Performed by: INTERNAL MEDICINE

## 2025-05-10 PROCEDURE — 85025 COMPLETE CBC W/AUTO DIFF WBC: CPT

## 2025-05-10 PROCEDURE — 36415 COLL VENOUS BLD VENIPUNCTURE: CPT

## 2025-05-10 PROCEDURE — 2500000001 HC RX 250 WO HCPCS SELF ADMINISTERED DRUGS (ALT 637 FOR MEDICARE OP): Performed by: INTERNAL MEDICINE

## 2025-05-10 PROCEDURE — 99239 HOSP IP/OBS DSCHRG MGMT >30: CPT

## 2025-05-10 RX ORDER — POTASSIUM CHLORIDE 750 MG/1
10 TABLET, FILM COATED, EXTENDED RELEASE ORAL DAILY
Start: 2025-05-11

## 2025-05-10 RX ORDER — CELECOXIB 200 MG/1
200 CAPSULE ORAL DAILY
Start: 2025-05-11

## 2025-05-10 RX ORDER — PANTOPRAZOLE SODIUM 40 MG/1
40 TABLET, DELAYED RELEASE ORAL
Start: 2025-05-11

## 2025-05-10 RX ORDER — ACETAMINOPHEN 325 MG/1
650 TABLET ORAL 3 TIMES DAILY
Start: 2025-05-10

## 2025-05-10 RX ORDER — AMLODIPINE BESYLATE 5 MG/1
5 TABLET ORAL DAILY
Start: 2025-05-11

## 2025-05-10 RX ORDER — FUROSEMIDE 20 MG/1
20 TABLET ORAL DAILY
Start: 2025-05-11

## 2025-05-10 RX ORDER — METOPROLOL SUCCINATE 25 MG/1
25 TABLET, EXTENDED RELEASE ORAL DAILY
Start: 2025-05-11

## 2025-05-10 RX ADMIN — POTASSIUM CHLORIDE 10 MEQ: 750 TABLET, EXTENDED RELEASE ORAL at 09:00

## 2025-05-10 RX ADMIN — PANTOPRAZOLE SODIUM 40 MG: 40 TABLET, DELAYED RELEASE ORAL at 06:09

## 2025-05-10 RX ADMIN — ACETAMINOPHEN 650 MG: 325 TABLET ORAL at 09:00

## 2025-05-10 RX ADMIN — AMLODIPINE BESYLATE 5 MG: 5 TABLET ORAL at 09:00

## 2025-05-10 RX ADMIN — HEPARIN SODIUM 5000 UNITS: 5000 INJECTION INTRAVENOUS; SUBCUTANEOUS at 09:02

## 2025-05-10 RX ADMIN — FUROSEMIDE 20 MG: 20 TABLET ORAL at 09:00

## 2025-05-10 RX ADMIN — CELECOXIB 200 MG: 200 CAPSULE ORAL at 09:00

## 2025-05-10 ASSESSMENT — COGNITIVE AND FUNCTIONAL STATUS - GENERAL
CLIMB 3 TO 5 STEPS WITH RAILING: A LITTLE
MOBILITY SCORE: 22
WALKING IN HOSPITAL ROOM: A LITTLE

## 2025-05-10 ASSESSMENT — PAIN - FUNCTIONAL ASSESSMENT
PAIN_FUNCTIONAL_ASSESSMENT: 0-10

## 2025-05-10 ASSESSMENT — PAIN SCALES - GENERAL
PAINLEVEL_OUTOF10: 3
PAINLEVEL_OUTOF10: 0 - NO PAIN
PAINLEVEL_OUTOF10: 1

## 2025-05-10 NOTE — NURSING NOTE
Patient refused to put a gown on and stated to let all other aides to know she does not want a gown on.

## 2025-05-10 NOTE — PROGRESS NOTES
05/10/25 1447   Discharge Planning   Home or Post Acute Services Post acute facilities (Rehab/SNF/etc)   Type of Post Acute Facility Services Skilled nursing   Expected Discharge Disposition SNF  ([facility] pending precert)     Precert Status: Approved  Certification Number: [id number]  Dates: 05/09/2025 - 05/15/2025    Patient will be discharged to [facility] via stretcher due to falls risk and dementia.  time is 1730.

## 2025-05-10 NOTE — DISCHARGE SUMMARY
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)       Issues Requiring Follow-Up  Follow up PCP 1-2 weeks  Follow up Cardiology 2-3 weeks       Discharge Meds     Medication List      START taking these medications     amLODIPine 5 mg tablet; Commonly known as: Norvasc; Take 1 tablet (5 mg)   by mouth once daily.; Start taking on: May 11, 2025   celecoxib 200 mg capsule; Commonly known as: CeleBREX; Take 1 capsule   (200 mg) by mouth once daily.; Start taking on: May 11, 2025   furosemide 20 mg tablet; Commonly known as: Lasix; Take 1 tablet (20 mg)   by mouth once daily.; Start taking on: May 11, 2025   metoprolol succinate XL 25 mg 24 hr tablet; Commonly known as:   Toprol-XL; Take 1 tablet (25 mg) by mouth once daily. Do not crush or   chew.; Start taking on: May 11, 2025   pantoprazole 40 mg EC tablet; Commonly known as: ProtoNix; Take 1 tablet   (40 mg) by mouth once daily in the morning. Take before meals. Do not   crush, chew, or split.; Start taking on: May 11, 2025   potassium chloride CR 10 mEq ER tablet; Commonly known as: Klor-Con;   Take 1 tablet (10 mEq) by mouth once daily. Do not crush, chew, or split.;   Start taking on: May 11, 2025     CHANGE how you take these medications     acetaminophen 325 mg tablet; Commonly known as: Tylenol; Take 2 tablets   (650 mg) by mouth 3 times a day.; What changed: medication strength, how   much to take, when to take this     STOP taking these medications     amLODIPine-benazepriL 5-10 mg capsule; Commonly known as: Lotrel   omeprazole 40 mg DR capsule; Commonly known as: PriLOSEC       Test Results Pending At Discharge  Pending Labs       Order Current Status    Extra Tubes In process    Lavender Top In process            Hospital Course  89 year old female pmhx dementia admitted 5/5/25 for failure to thrive. Hospital course c/b new onset afib RVR s/p cardiology eval. Medications adjusted per cardiology. Not  candidate of anticoagulation due to age/risk profile. Cardiology has since signed off.     At this point, patient is awake alert oriented x 1-2 which is her baseline. Afib rate controlled. Denies pain or complaints.  States she is ready to discharge today. PT OT recommending mod intensity rehabilitation needs upon discharge.  Discussed with case management: Has received pre-CERT to SNF for further rehabilitation.    Patient to follow up PCP 1-2 weeks  Cardiology 2-4 weeks    Pertinent Physical Exam At Time of Discharge  Physical Exam  Vitals and nursing note reviewed.   Constitutional:       Appearance: Normal appearance.   HENT:      Head: Normocephalic and atraumatic.      Right Ear: External ear normal.      Left Ear: External ear normal.      Nose: Nose normal.      Mouth/Throat:      Mouth: Mucous membranes are moist.   Eyes:      Extraocular Movements: Extraocular movements intact.      Conjunctiva/sclera: Conjunctivae normal.   Cardiovascular:      Rate and Rhythm: Normal rate. Rhythm irregular.      Pulses: Normal pulses.      Heart sounds: Normal heart sounds.      Comments: Afib rate controlled  Pulmonary:      Effort: Pulmonary effort is normal.      Breath sounds: Normal breath sounds.   Abdominal:      General: Bowel sounds are normal.      Palpations: Abdomen is soft.   Genitourinary:     Comments: Deferred  Musculoskeletal:         General: Normal range of motion.      Cervical back: Normal range of motion and neck supple.   Skin:     General: Skin is warm and dry.      Capillary Refill: Capillary refill takes less than 2 seconds.   Neurological:      General: No focal deficit present.      Mental Status: She is alert. Mental status is at baseline.      Comments: A0x1-2 confused at baseline  Hx Dementia    Psychiatric:         Mood and Affect: Mood normal.      Comments: Pleasantly confused       Time spent on discharge 35 minutes.   Outpatient Follow-Up  No future appointments.      Aury Swift,  APRN-CNP

## 2025-05-10 NOTE — PROGRESS NOTES
Nirmala Patrick is a 89 y.o. 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      Subjective   Sitting in bed.  Awake alert oriented x 1-2 at baseline.  Denies pain or complaints.       Objective     Last Recorded Vitals  /64 (BP Location: Right arm, Patient Position: Lying)   Pulse 64   Temp 36.5 °C (97.7 °F) (Oral)   Resp 20   Wt 83.9 kg (185 lb)   SpO2 97%   Intake/Output last 3 Shifts:    Intake/Output Summary (Last 24 hours) at 5/10/2025 1246  Last data filed at 5/10/2025 0900  Gross per 24 hour   Intake 240 ml   Output --   Net 240 ml       Admission Weight  Weight: 83.9 kg (185 lb) (05/05/25 1154)    Daily Weight  05/05/25 : 83.9 kg (185 lb)    Image Results  ECG 12 lead  Atrial fibrillation  Rightward axis  Low voltage QRS  Septal infarct , age undetermined  Abnormal ECG    Confirmed by Duane Mayo ([id number]) on 5/6/2025 10:57:43 AM      Physical Exam  Vitals and nursing note reviewed.   Constitutional:       Appearance: Normal appearance.   HENT:      Head: Normocephalic and atraumatic.      Right Ear: External ear normal.      Left Ear: External ear normal.      Ears:      Comments: Very hard of hearing     Nose: Nose normal.      Mouth/Throat:      Mouth: Mucous membranes are moist.   Eyes:      Extraocular Movements: Extraocular movements intact.      Conjunctiva/sclera: Conjunctivae normal.   Cardiovascular:      Rate and Rhythm: Normal rate.      Pulses: Normal pulses.      Heart sounds: Normal heart sounds.   Pulmonary:      Effort: Pulmonary effort is normal.      Breath sounds: Normal breath sounds.   Abdominal:      General: Bowel sounds are normal.      Palpations: Abdomen is soft.   Musculoskeletal:         General: Normal range of motion.      Cervical back: Normal range of motion and neck supple.   Skin:     General: Skin is warm and dry.      Capillary Refill: Capillary refill takes less than 2 seconds.   Neurological:      Mental Status:  She is alert.      Comments: Alert and oriented x 1 sometimes 2  Suspect at baseline   Psychiatric:         Mood and Affect: Mood normal.      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 PT OT    Atrial fibrillation  Continue BB w/hold parameters  Telemetry  Not candidate for anticoagulation due to advanced age/risk profile  S/P cardiology consult, signed off    HTN  Continue BB and amlodipine  Attending has added Lasix yesterday  Monitor BP    Dementia  Monitor for behaviors  Reorient frequently    DVT/GI  Heparin subcu/PPI    Disposition: SNF pending pre-CERT             Aury Swift, APRN-CNP

## 2025-05-10 NOTE — CARE PLAN
The patient's goals for the shift include MAINTAIN SAFETY,PAIN CTRL    The clinical goals for the shift include: MAINTAIN SAFETY AND COMFORT, DISCHARGE PLANNING      Problem: Fall/Injury  Goal: Not fall by end of shift  5/10/2025 0730 by Steffany Rivera RN  Outcome: Progressing  5/10/2025 0730 by Steffany Rivera RN  Outcome: Progressing  Goal: Be free from injury by end of the shift  5/10/2025 0730 by Steffany Rivera, RN  Outcome: Progressing  5/10/2025 0730 by Steffany Rivera RN  Outcome: Progressing  Goal: Verbalize understanding of personal risk factors for fall in the hospital  5/10/2025 0730 by Steffany Rivera, SHY  Outcome: Progressing  5/10/2025 0730 by Steffany Rivera RN  Outcome: Progressing  Goal: Verbalize understanding of risk factor reduction measures to prevent injury from fall in the home  5/10/2025 0730 by Steffany Rivera, RN  Outcome: Progressing  5/10/2025 0730 by Steffany Rivera RN  Outcome: Progressing  Goal: Use assistive devices by end of the shift  5/10/2025 0730 by Steffany Rivera, RN  Outcome: Progressing  5/10/2025 0730 by Steffany Rivera RN  Outcome: Progressing  Goal: Pace activities to prevent fatigue by end of the shift  5/10/2025 0730 by Steffany Rivera, RN  Outcome: Progressing  5/10/2025 0730 by Steffany Rivera RN  Outcome: Progressing     Problem: Pain - Adult  Goal: Verbalizes/displays adequate comfort level or baseline comfort level  5/10/2025 0730 by Steffany Rivera, RN  Outcome: Progressing  5/10/2025 0730 by Steffany Rivera RN  Outcome: Progressing     Problem: Safety - Adult  Goal: Free from fall injury  5/10/2025 0730 by Steffany Rivera RN  Outcome: Progressing  5/10/2025 0730 by Steffany Rivera RN  Outcome: Progressing     Problem: Discharge Planning  Goal: Discharge to home or other facility with appropriate resources  5/10/2025 5714 by  Steffany Rivera RN  Outcome: Progressing  5/10/2025 0730 by Steffany Rivera RN  Outcome: Progressing     Problem: Chronic Conditions and Co-morbidities  Goal: Patient's chronic conditions and co-morbidity symptoms are monitored and maintained or improved  5/10/2025 0730 by Steffany Rivera RN  Outcome: Progressing  5/10/2025 0730 by Steffany Rivera RN  Outcome: Progressing     Problem: Nutrition  Goal: Nutrient intake appropriate for maintaining nutritional needs  5/10/2025 0730 by Steffany Rivera RN  Outcome: Progressing  5/10/2025 0730 by Steffany Rivera RN  Outcome: Progressing     Problem: Skin  Goal: Prevent/minimize sheer/friction injuries  5/10/2025 0730 by Steffany Rivera RN  Outcome: Progressing  5/10/2025 0730 by Steffany Rivera RN  Outcome: Progressing  Goal: Promote/optimize nutrition  5/10/2025 0730 by Steffany Rivera RN  Outcome: Progressing  5/10/2025 0730 by Steffany Rivera RN  Outcome: Progressing  Goal: Promote skin healing  5/10/2025 0730 by Steffany Rivera RN  Outcome: Progressing  5/10/2025 0730 by Steffany Rivera RN  Outcome: Progressing

## 2025-05-14 ENCOUNTER — APPOINTMENT (OUTPATIENT)
Dept: PRIMARY CARE | Facility: CLINIC | Age: OVER 89
End: 2025-05-14
Payer: MEDICARE

## 2025-05-22 ENCOUNTER — DOCUMENTATION (OUTPATIENT)
Dept: CARE COORDINATION | Facility: CLINIC | Age: 89
End: 2025-05-22
Payer: MEDICARE

## 2025-05-22 ENCOUNTER — DOCUMENTATION (OUTPATIENT)
Dept: PRIMARY CARE | Facility: CLINIC | Age: 89
End: 2025-05-22
Payer: MEDICARE

## 2025-05-22 ENCOUNTER — TELEPHONE (OUTPATIENT)
Dept: PRIMARY CARE | Facility: CLINIC | Age: 89
End: 2025-05-22
Payer: MEDICARE

## 2025-05-22 ENCOUNTER — PATIENT OUTREACH (OUTPATIENT)
Dept: PRIMARY CARE | Facility: CLINIC | Age: 89
End: 2025-05-22
Payer: MEDICARE

## 2025-05-22 NOTE — TELEPHONE ENCOUNTER
Georgia astorga from [facility] requesting verbal order for PT for nursing, physical therapy, occupational therapy and speech. Please call [phone]

## 2025-05-22 NOTE — PROGRESS NOTES
Discharge Facility:[facility]  Discharge Diagnosis:New Afib-RVR  Failure to Thrive  Dementia due to Alzheimer's disease  Admission Date:5/6/25  Discharge Date: 5/10/25-SNF 5/21/25    PCP Appointment Date:Patient friend will schedule  Specialist Appointment Date: Cardiology  Hospital Encounter and Summary Linked: Yes  See discharge assessment below for further details  Discharge Summary by Aury Swift APRN-CNP (05/10/2025 15:49)     Wrap Up  Wrap Up Additional Comments: This CM spoke with pt via phone. Pt reports doing well at home since discharge. New meds reviewed. Pt denies CP and SOB. Patient denies any further discharge questions/needs at this time. Emphasized that Follow up is needed after discharge to review the hospital recommendations, assess your response to your treatment.  Pt aware of my availability for non-emergent concerns. Contact info provided to patient (5/22/2025 10:57 AM)    Engagement  Call Start Time: 1057 (5/22/2025 10:57 AM)    Medications  Medications reviewed with patient/caregiver?: Yes (5/22/2025 10:57 AM)  Is the patient having any side effects they believe may be caused by any medication additions or changes?: No (5/22/2025 10:57 AM)  Does the patient have all medications ordered at discharge?: Yes (5/22/2025 10:57 AM)  Prescription Comments: START taking:  amLODIPine (Norvasc)   Start taking on: May 11, 2025  celecoxib (CeleBREX)   Start taking on: May 11, 2025  furosemide (Lasix)   Start taking on: May 11, 2025  metoprolol succinate XL (Toprol-XL)   Start taking on: May 11, 2025  pantoprazole (ProtoNix)   Start taking on: May 11, 2025  potassium chloride CR (Klor-Con)   Start taking on: May 11, 2025   CHANGE how you take:  acetaminophen (Tylenol)    STOP taking:  amLODIPine-benazepriL 5-10 mg capsule (Lotrel)   omeprazole 40 mg DR capsule (PriLOSEC) (5/22/2025 10:57 AM)  Is the patient taking all medications as directed (includes completed medication regime)?: Yes (5/22/2025 10:57  AM)  Medication Comments: see med list (5/22/2025 10:57 AM)    Appointments  Does the patient have a primary care provider?: Yes (5/22/2025 10:57 AM)  Care Management Interventions: Educated patient on importance of making appointment; Advised patient to make appointment (5/22/2025 10:57 AM)  Has the patient kept scheduled appointments due by today?: Yes (5/22/2025 10:57 AM)  Care Management Interventions: Advised to schedule with specialist (Cardiology) (5/22/2025 10:57 AM)    Patient Teaching  Does the patient have access to their discharge instructions?: Yes (5/22/2025 10:57 AM)  Care Management Interventions: Reviewed instructions with patient (5/22/2025 10:57 AM)  What is the patient's perception of their health status since discharge?: Improving (5/22/2025 10:57 AM)  Is the patient/caregiver able to teach back the hierarchy of who to call/visit for symptoms/problems? PCP, Specialist, Home Health nurse, Urgent Care, ED, 911: Yes (5/22/2025 10:57 AM)            Cassandra Marr LPN

## 2025-06-17 ENCOUNTER — DOCUMENTATION (OUTPATIENT)
Dept: PRIMARY CARE | Facility: CLINIC | Age: 89
End: 2025-06-17
Payer: MEDICARE

## 2025-06-17 NOTE — PROGRESS NOTES
Subjective   Nirmala Patrick is a 89 y.o. female who presents for hospital follow up for new afib RVR, failure to thrive, and Alzheimer's dementia (admit 5/5-5/10/25).    HPI:      89 y.o. female who presents for  hospital follow up for new afib RVR, failure to thrive, and Alzheimer's dementia (admit 5/5-5/10/25).     EMR/EPIC records reviewed.    Last seen by me on 12/24/24 with her boyfriend Darron Fisher  for FOLLOW UP VISIT FOR HTN and GERD. At visit:    HTN: well controlled  -CMP, UA ordered 8/16/24  -cont BP medications  -low salt diet, regular exercise, limit alcohol intake     Memory decline: no red flag signs/sxs.  -referral to neurology and neuropsychology ordered 8/16/24  -emergency Dept precautions discussed and reviewed with patient and boyfriend     Atypical Nevi:  -referral to dermatology ordered 8/16/24     GERD:  -cont PPI               Lipid Disorder screening  -lipid panel ordered 8/16/24     Vitamin D deficiency  -Vit D levels ordered 8/16/24     Hep C screening  -Hep C antibody ordered 8/16/24     STI Screening:  -HIV, syphilis ordered 8/16/24     Age related osteoporosis:  -DEXA ordered 8/16/24     Other: patient advised to complete previously ordered labs         Immunizations Counseling  -Flu now due==>  RECEIVED TODAY  -recommend updated COVID spike vaccine, shingles, and RSV that can be obtained at local pharmacy     FOLLOW-UP: 4 weeks to discuss and review test results and for Medicare Wellness Visit          PMHx:  HTN  Afib; not a candidate for AC due to age/risk profile (per inpatient cardiology)  GERD  Alzheimers dementia  Failure to thrive  Atypical Nevi     Healthcare Providers:  Prior PCP: Dr. Li     Preventive Health Services:  -Last physical:/Medicare Wellness Visit:  NOW DUE  -pap smears: no longer indicated due to advanced age  -last mammogram: ? ; no longer indicated due to advanced age  -last colonoscopy: ?; no longer indicated due to advanced age  -last STI screening:  "?  -Hep C screening: ?  -DEXA: ?; NOW DUE; ordered 8/16/24 NOT YET COMPLETED     Immunizations:   -Childhood vaccines: completed per patient    -updated COVID spike vaccine: NOW DUE  -shingrix:  NOW DUE  -RSV: NOW DUE   Immunization History   Administered Date(s) Administered    COVID-19, mRNA, LNP-S, PF, 30 mcg/0.3 mL dose 12/31/2021    Flu vaccine, quadrivalent, high-dose, preservative free, age 65y+ (FLUZONE) 10/08/2021, 10/12/2022, 09/14/2023    Flu vaccine, trivalent, preservative free, HIGH-DOSE, age 65y+ (Fluzone) 12/24/2024    Influenza, seasonal, injectable 11/04/2011    Pfizer Gray Cap SARS-CoV-2 02/22/2022, 07/21/2022    Pneumococcal conjugate vaccine, 13-valent (PREVNAR 13) 05/31/2018, 05/31/2018    Pneumococcal conjugate vaccine, 20-valent (PREVNAR 20) 08/16/2024    Pneumococcal polysaccharide vaccine, 23-valent, age 2 years and older (PNEUMOVAX 23) 09/04/2019    Tdap vaccine, age 7 year and older (BOOSTRIX, ADACEL) 09/04/2019    Zoster, live 09/04/2013     INTERVAL HISTORY:     -labs and DEXA ordered 8/16/24 NOT YET COMPLETED     -admit 5/5-5/10/25   for new afib RVR, failure to thrive, and Alzheimer's dementia. Hospital discharge summary and inpatient notes, labs, and imaging reviewed.  Per hospital provider  discharge summary:    \" 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)     Issues Requiring Follow-Up  Follow up PCP 1-2 weeks  Follow up Cardiology 2-3 weeks         Discharge Meds     Medication List      START taking these medications     amLODIPine 5 mg tablet; Commonly known as: Norvasc; Take 1 tablet (5 mg)   by mouth once daily.; Start taking on: May 11, 2025   celecoxib 200 mg capsule; Commonly known as: CeleBREX; Take 1 capsule   (200 mg) by mouth once daily.; Start taking on: May 11, 2025   furosemide 20 mg tablet; Commonly known as: Lasix; Take 1 tablet (20 mg)   by mouth once daily.; Start taking on: May 11, 2025   metoprolol " succinate XL 25 mg 24 hr tablet; Commonly known as:   Toprol-XL; Take 1 tablet (25 mg) by mouth once daily. Do not crush or   chew.; Start taking on: May 11, 2025   pantoprazole 40 mg EC tablet; Commonly known as: ProtoNix; Take 1 tablet   (40 mg) by mouth once daily in the morning. Take before meals. Do not   crush, chew, or split.; Start taking on: May 11, 2025   potassium chloride CR 10 mEq ER tablet; Commonly known as: Klor-Con;   Take 1 tablet (10 mEq) by mouth once daily. Do not crush, chew, or split.;   Start taking on: May 11, 2025     CHANGE how you take these medications     acetaminophen 325 mg tablet; Commonly known as: Tylenol; Take 2 tablets   (650 mg) by mouth 3 times a day.; What changed: medication strength, how   much to take, when to take this     STOP taking these medications     amLODIPine-benazepriL 5-10 mg capsule; Commonly known as: Lotrel   omeprazole 40 mg DR capsule; Commonly known as: PriLOSEC        Test Results Pending At Discharge  Pending Labs         Order Current Status     Extra Tubes In process     Lavender Top In process                Hospital Course  89 year old female pmhx dementia admitted 5/5/25 for failure to thrive. Hospital course c/b new onset afib RVR s/p cardiology eval. Medications adjusted per cardiology. Not candidate of anticoagulation due to age/risk profile. Cardiology has since signed off.      At this point, patient is awake alert oriented x 1-2 which is her baseline. Afib rate controlled. Denies pain or complaints.  States she is ready to discharge today. PT OT recommending mod intensity rehabilitation needs upon discharge.  Discussed with case management: Has received pre-CERT to SNF for further rehabilitation.     Patient to follow up PCP 1-2 weeks  Cardiology 2-4 weeks     Pertinent Physical Exam At Time of Discharge  Physical Exam  Vitals and nursing note reviewed.   Constitutional:       Appearance: Normal appearance.   HENT:      Head: Normocephalic and  "atraumatic.      Right Ear: External ear normal.      Left Ear: External ear normal.      Nose: Nose normal.      Mouth/Throat:      Mouth: Mucous membranes are moist.   Eyes:      Extraocular Movements: Extraocular movements intact.      Conjunctiva/sclera: Conjunctivae normal.   Cardiovascular:      Rate and Rhythm: Normal rate. Rhythm irregular.      Pulses: Normal pulses.      Heart sounds: Normal heart sounds.      Comments: Afib rate controlled  Pulmonary:      Effort: Pulmonary effort is normal.      Breath sounds: Normal breath sounds.   Abdominal:      General: Bowel sounds are normal.      Palpations: Abdomen is soft.   Genitourinary:     Comments: Deferred  Musculoskeletal:         General: Normal range of motion.      Cervical back: Normal range of motion and neck supple.   Skin:     General: Skin is warm and dry.      Capillary Refill: Capillary refill takes less than 2 seconds.   Neurological:      General: No focal deficit present.      Mental Status: She is alert. Mental status is at baseline.      Comments: A0x1-2 confused at baseline  Hx Dementia    Psychiatric:         Mood and Affect: Mood normal.      Comments: Pleasantly confused         Time spent on discharge 35 minutes.   Outpatient Follow-Up  No future appointments.\"           Today Nirmala reports:     - doing and feeling well since hospital and nursing home discharge     -throughout visit today, patient repeatedly asked the same questions and would forget things immediately after they were discussed.      -Her boyfriend states that she frequently forgets things and he is concerned that she has dementia. At last visit on 8/16/24 she expressed interest in referral to neurology for evaluation and was previously referred to neuropsychology and neurology on 8/16/24.  Patient's boyfriend reports that he did not schedule appt with neurology for evaluation; he reports that he/and she are interested in referral to neurology and starting medication " for dementia.     -taking all medications as prescribed with no reported adverse medication side effects     -has not yet scheduled appts with cardiology,  neurology, neuropsychology, and dermatology; her boyfriend states that he will call to schedule these appts       Today she has no other reported complaints, issues, or problems.  ROS is NEG for HEADACHE, NAUSEA, VOMITING, DIARRHEA, CHEST PAIN, SOB, and BLEEDING.  Review of systems (12) is  negative for all systems except for any identified issues in HPI above.         Objective     /66   Pulse 73   Temp 36.3 °C (97.3 °F)   SpO2 97%      Physical Examination:       GENERAL           General Appearance: well-appearing, well-developed, well-hydrated, well-nourished, no acute distress.  Pleasant, but confused. Repeated herself multiple times throughout visit.       HEENT           NECK supple, no masses or thyromegaly, no carotid bruit.        EYES           Extraocular Movements: normal, bilateral eyes ANNETTE, no conjunctival injection.        HEART           Rate and Rhythm regular rate and rhythm. Heart sounds: normal S1S2, no S3 or S4. Murmurs: none.        CHEST           Breath sounds: Clear to IPPA, RR<16 no use of accessory muscles.        ABDOMEN           General: Neg for LKKS or masses, no scleral icterus or jaundice.        MUSCULOSKELETAL           Joints Demonstration: Neg for erythema, swelling or joint deformities. gross abnormalities no gross abnormalities.        EXTREMITIES           Lower Extremities: Neg for cyanosis, clubbing or edema.       Assessment/Plan   Problem List Items Addressed This Visit       Hypertension    Relevant Orders    Comprehensive metabolic panel    Urinalysis with Reflex Microscopic    Gastroesophageal reflux disease without esophagitis     Other Visit Diagnoses         Atrial fibrillation, unspecified type (Multi)    -  Primary    Relevant Medications    metoprolol succinate XL (Toprol-XL) 25 mg 24 hr tablet     amLODIPine (Norvasc) 5 mg tablet    Other Relevant Orders    Referral to Cardiology    Referral to [facility]      Atypical nevi          Dementia, unspecified dementia severity, unspecified dementia type, unspecified whether behavioral, psychotic, or mood disturbance or anxiety (Multi)        Relevant Orders    Referral to Neurology    Referral to [facility]      Age related osteoporosis, unspecified pathological fracture presence        Relevant Orders    XR DEXA bone density      Immunization counseling          Vitamin D deficiency        Relevant Orders    Vitamin D 25-Hydroxy,Total (for eval of Vitamin D levels)      Atrial fib/flutter, transient (Multi)        Relevant Medications    metoprolol succinate XL (Toprol-XL) 25 mg 24 hr tablet    furosemide (Lasix) 20 mg tablet    potassium chloride CR 10 mEq ER tablet    amLODIPine (Norvasc) 5 mg tablet    pantoprazole (ProtoNix) 40 mg EC tablet    celecoxib (CeleBREX) 200 mg capsule          Afib with RVR: admit 5/5-5/10/25. Rate controlled.  Not on AC due to age/risk profile. No red flag signs/sxs  -cont metoprolol succinate XL 25 mg daily  -referral to cardiology ordered for evaluation  -emergency Dept and 911/EMS precautions discussed with patient and her boyfriend      HTN: hypotensive, asymptomatic.   -CMP, UA  ordered  -cont amlodipine 5 mg daily   -low salt diet, regular exercise, limit alcohol intake  -emergency Dept and 911/EMS precautions discussed with patient and her boyfriend     Memory decline and Alzheimer's dementia: no red flag signs/sxs.  -referral to neurology and neuropsychology ordered 8/16/24; re-ordered referral to neurology today  -start aricept 5 mg daily  -emergency Dept precautions discussed and reviewed with patient and boyfriend     Atypical Nevi:  -referral to dermatology ordered 8/16/24; re-ordered referral today     GERD:  -cont PPI                  Age related osteoporosis:  -DEXA ordered 8/16/24;  re-ordered today        Counseling:       Medication education:         Education:  The patient is counseled regarding potential side-effects of all new medications        Understanding:  Patient expressed understanding        Adherence:  No barriers to adherence identified        Immunizations Counseling  -recommend updated COVID spike vaccine, shingles, and RSV that can be obtained at local pharmacy     FOLLOW-UP: 4 weeks for Medicare Wellness Visit        I have personally reviewed all available pertinent labs, imaging, and consult notes with the patient.    Discussed recommended plan of care with patient. Patient expressed understanding and agreement with plan of care. All of patient's questions were answered at the time. Patient had no additional questions at the time.            Elizabet Sanchez MD, PhD

## 2025-06-18 ENCOUNTER — OFFICE VISIT (OUTPATIENT)
Dept: PRIMARY CARE | Facility: CLINIC | Age: 89
End: 2025-06-18
Payer: MEDICARE

## 2025-06-18 VITALS
HEART RATE: 73 BPM | OXYGEN SATURATION: 97 % | TEMPERATURE: 97.3 F | SYSTOLIC BLOOD PRESSURE: 108 MMHG | DIASTOLIC BLOOD PRESSURE: 66 MMHG

## 2025-06-18 DIAGNOSIS — I48.92 ATRIAL FIB/FLUTTER, TRANSIENT (MULTI): ICD-10-CM

## 2025-06-18 DIAGNOSIS — I48.91 ATRIAL FIBRILLATION, UNSPECIFIED TYPE (MULTI): Primary | ICD-10-CM

## 2025-06-18 DIAGNOSIS — E55.9 VITAMIN D DEFICIENCY: ICD-10-CM

## 2025-06-18 DIAGNOSIS — I48.91 ATRIAL FIB/FLUTTER, TRANSIENT (MULTI): ICD-10-CM

## 2025-06-18 DIAGNOSIS — F03.90 DEMENTIA, UNSPECIFIED DEMENTIA SEVERITY, UNSPECIFIED DEMENTIA TYPE, UNSPECIFIED WHETHER BEHAVIORAL, PSYCHOTIC, OR MOOD DISTURBANCE OR ANXIETY (MULTI): ICD-10-CM

## 2025-06-18 DIAGNOSIS — R26.2 DIFFICULTY WALKING: ICD-10-CM

## 2025-06-18 DIAGNOSIS — D22.9 ATYPICAL NEVI: ICD-10-CM

## 2025-06-18 DIAGNOSIS — Z71.85 IMMUNIZATION COUNSELING: ICD-10-CM

## 2025-06-18 DIAGNOSIS — K21.9 GASTROESOPHAGEAL REFLUX DISEASE WITHOUT ESOPHAGITIS: ICD-10-CM

## 2025-06-18 DIAGNOSIS — M81.0 AGE RELATED OSTEOPOROSIS, UNSPECIFIED PATHOLOGICAL FRACTURE PRESENCE: ICD-10-CM

## 2025-06-18 DIAGNOSIS — I10 PRIMARY HYPERTENSION: ICD-10-CM

## 2025-06-18 PROCEDURE — 3074F SYST BP LT 130 MM HG: CPT | Performed by: FAMILY MEDICINE

## 2025-06-18 PROCEDURE — 3078F DIAST BP <80 MM HG: CPT | Performed by: FAMILY MEDICINE

## 2025-06-18 PROCEDURE — 1160F RVW MEDS BY RX/DR IN RCRD: CPT | Performed by: FAMILY MEDICINE

## 2025-06-18 PROCEDURE — 99214 OFFICE O/P EST MOD 30 MIN: CPT | Performed by: FAMILY MEDICINE

## 2025-06-18 PROCEDURE — G2211 COMPLEX E/M VISIT ADD ON: HCPCS | Performed by: FAMILY MEDICINE

## 2025-06-18 PROCEDURE — 1159F MED LIST DOCD IN RCRD: CPT | Performed by: FAMILY MEDICINE

## 2025-06-18 PROCEDURE — 1126F AMNT PAIN NOTED NONE PRSNT: CPT | Performed by: FAMILY MEDICINE

## 2025-06-18 PROCEDURE — 1036F TOBACCO NON-USER: CPT | Performed by: FAMILY MEDICINE

## 2025-06-18 RX ORDER — PANTOPRAZOLE SODIUM 40 MG/1
40 TABLET, DELAYED RELEASE ORAL
Qty: 90 TABLET | Refills: 3 | Status: SHIPPED | OUTPATIENT
Start: 2025-06-18

## 2025-06-18 RX ORDER — CELECOXIB 200 MG/1
200 CAPSULE ORAL DAILY
Qty: 90 CAPSULE | Refills: 0 | Status: SHIPPED | OUTPATIENT
Start: 2025-06-18

## 2025-06-18 RX ORDER — DONEPEZIL HYDROCHLORIDE 5 MG/1
5 TABLET, FILM COATED ORAL NIGHTLY
Qty: 90 TABLET | Refills: 3 | Status: SHIPPED | OUTPATIENT
Start: 2025-06-18 | End: 2026-06-18

## 2025-06-18 RX ORDER — FUROSEMIDE 20 MG/1
20 TABLET ORAL DAILY
Qty: 90 TABLET | Refills: 3 | Status: SHIPPED | OUTPATIENT
Start: 2025-06-18

## 2025-06-18 RX ORDER — METOPROLOL SUCCINATE 25 MG/1
25 TABLET, EXTENDED RELEASE ORAL DAILY
Qty: 90 TABLET | Refills: 3 | Status: SHIPPED | OUTPATIENT
Start: 2025-06-18

## 2025-06-18 RX ORDER — POTASSIUM CHLORIDE 750 MG/1
10 TABLET, FILM COATED, EXTENDED RELEASE ORAL DAILY
Qty: 90 TABLET | Refills: 3 | Status: SHIPPED | OUTPATIENT
Start: 2025-06-18

## 2025-06-18 RX ORDER — AMLODIPINE BESYLATE 5 MG/1
5 TABLET ORAL DAILY
Qty: 90 TABLET | Refills: 3 | Status: SHIPPED | OUTPATIENT
Start: 2025-06-18

## 2025-06-18 ASSESSMENT — PATIENT HEALTH QUESTIONNAIRE - PHQ9
SUM OF ALL RESPONSES TO PHQ9 QUESTIONS 1 AND 2: 0
2. FEELING DOWN, DEPRESSED OR HOPELESS: NOT AT ALL
1. LITTLE INTEREST OR PLEASURE IN DOING THINGS: NOT AT ALL

## 2025-06-18 ASSESSMENT — PAIN SCALES - GENERAL: PAINLEVEL_OUTOF10: 0-NO PAIN

## 2025-06-19 ENCOUNTER — PATIENT OUTREACH (OUTPATIENT)
Dept: PRIMARY CARE | Facility: CLINIC | Age: 89
End: 2025-06-19
Payer: MEDICARE

## 2025-06-20 ENCOUNTER — TELEPHONE (OUTPATIENT)
Dept: PRIMARY CARE | Facility: CLINIC | Age: 89
End: 2025-06-20
Payer: MEDICARE

## 2025-06-20 DIAGNOSIS — I48.91 ATRIAL FIBRILLATION, UNSPECIFIED TYPE (MULTI): ICD-10-CM

## 2025-06-20 DIAGNOSIS — G30.9 DEMENTIA DUE TO ALZHEIMER'S DISEASE (MULTI): ICD-10-CM

## 2025-06-20 DIAGNOSIS — F02.80 DEMENTIA DUE TO ALZHEIMER'S DISEASE (MULTI): ICD-10-CM

## 2025-06-20 NOTE — TELEPHONE ENCOUNTER
Referral to case manger entered incorrectly - new referral pended to be entered and signed for case mgmt services

## 2025-06-23 ENCOUNTER — PATIENT OUTREACH (OUTPATIENT)
Dept: PRIMARY CARE | Facility: CLINIC | Age: 89
End: 2025-06-23
Payer: MEDICARE

## 2025-06-23 DIAGNOSIS — I48.91 ATRIAL FIBRILLATION, UNSPECIFIED TYPE (MULTI): ICD-10-CM

## 2025-06-23 DIAGNOSIS — I10 PRIMARY HYPERTENSION: ICD-10-CM

## 2025-06-23 NOTE — PROGRESS NOTES
Referral received for care management services.  Home number is disconnected; contact made with significant other Darron Fisher.  I explained reason for my call and benefits of case management services but he tells me that she just finished with hospital and [place] [facility] and doesn't need any more phone calls.  I noticed that specialist referrals and radiology procedures have not been scheduled yet and asked if he knows what needs to be done as recommended by PCP.  He tells me no, he doesn't know but that Nirmala is doing just fine and they do not need me and proceeded to hang up on me.  I will close referral as caregiver declined services.

## 2025-06-24 ENCOUNTER — PATIENT OUTREACH (OUTPATIENT)
Dept: PRIMARY CARE | Facility: CLINIC | Age: 89
End: 2025-06-24
Payer: MEDICARE

## 2025-06-24 NOTE — PROGRESS NOTES
Discussed with PCP interaction with caregiver yesterday and declination of CCM services, but also stating he wasn't sure what was suggested by PCP for follow up care.  She recommended enacting POA and therefore called Ifrah Siddiqi.  [employer] assistant states she is unavailable but also does not allow option to leave voice message

## 2025-06-26 ENCOUNTER — PATIENT OUTREACH (OUTPATIENT)
Dept: PRIMARY CARE | Facility: CLINIC | Age: 89
End: 2025-06-26
Payer: MEDICARE

## 2025-06-26 NOTE — PROGRESS NOTES
Spoke with Radha and provided update of what PCP is recommending for tests and referrals.  She acknowledges that patient's boyfriend Darron is very overwhelmed but also dismissive and argumentative at times. He cooks and provides hydration to patient but she prefers to only drink wine or whiskey instead of any water.  I provided numbers for Dr. Bautista's office and [facility] to schedule appointments.  We discussed checking with [employer] to see if medical transportation included as benefit, otherwise using [place] with escort due to cognitive issues. Darron can drive but only within small area of [place].   Radha updated on current med list and need for labs and UA.  She will discuss with sister who lives local but states she doesn't speak with her mom much.  At her request, provided phone number for Dr. Li at [facility] as Darron is questioning recommendations of Dr. Sanchez and may listen to Dr. Li better. Case not opened at this time due to possible transition back to previous PCP

## 2025-06-26 NOTE — PROGRESS NOTES
Daughter Radha returned my call after hours.  I left another voice mail hoping to connect with her.

## 2025-06-30 ENCOUNTER — TELEPHONE (OUTPATIENT)
Dept: PRIMARY CARE | Facility: CLINIC | Age: 89
End: 2025-06-30
Payer: MEDICARE

## 2025-06-30 NOTE — TELEPHONE ENCOUNTER
[facility] has faxed a Physician Order form requesting the providers signature. Form placed in Dr. Sanchez's folder  in the front office for review and completion. Form to be faxed back to [phone]. Please advise.

## 2025-07-23 ENCOUNTER — APPOINTMENT (OUTPATIENT)
Dept: PRIMARY CARE | Facility: CLINIC | Age: 89
End: 2025-07-23
Payer: MEDICARE